# Patient Record
Sex: FEMALE | Race: WHITE | NOT HISPANIC OR LATINO | Employment: OTHER | URBAN - METROPOLITAN AREA
[De-identification: names, ages, dates, MRNs, and addresses within clinical notes are randomized per-mention and may not be internally consistent; named-entity substitution may affect disease eponyms.]

---

## 2017-03-23 ENCOUNTER — TELEPHONE (OUTPATIENT)
Dept: FAMILY MEDICINE | Facility: CLINIC | Age: 27
End: 2017-03-23

## 2017-04-10 ENCOUNTER — OFFICE VISIT (OUTPATIENT)
Dept: FAMILY MEDICINE | Facility: CLINIC | Age: 27
End: 2017-04-10
Payer: COMMERCIAL

## 2017-04-10 VITALS
BODY MASS INDEX: 22.36 KG/M2 | RESPIRATION RATE: 20 BRPM | SYSTOLIC BLOOD PRESSURE: 116 MMHG | DIASTOLIC BLOOD PRESSURE: 62 MMHG | HEIGHT: 64 IN | OXYGEN SATURATION: 99 % | TEMPERATURE: 98.4 F | WEIGHT: 131 LBS | HEART RATE: 104 BPM

## 2017-04-10 DIAGNOSIS — N94.10 DYSPAREUNIA IN FEMALE: Primary | ICD-10-CM

## 2017-04-10 DIAGNOSIS — N94.2 VAGINISMUS: ICD-10-CM

## 2017-04-10 PROCEDURE — 99213 OFFICE O/P EST LOW 20 MIN: CPT | Performed by: FAMILY MEDICINE

## 2017-04-10 ASSESSMENT — PAIN SCALES - GENERAL: PAINLEVEL: NO PAIN (0)

## 2017-04-10 NOTE — MR AVS SNAPSHOT
"              After Visit Summary   4/10/2017    Eden Yanez    MRN: [id number]           Patient Information     Date Of Birth          1990        Visit Information        Provider Department      4/10/2017 2:00 PM Miryam Perez MD [facility]        Today's Diagnoses     Dyspareunia in female    -  1    Vaginismus           Follow-ups after your visit        Who to contact     If you have questions or need follow up information about today's clinic visit or your schedule please contact [facility] directly at [phone].  Normal or non-critical lab and imaging results will be communicated to you by Wayinhart, letter or phone within 4 business days after the clinic has received the results. If you do not hear from us within 7 days, please contact the clinic through Wayinhart or phone. If you have a critical or abnormal lab result, we will notify you by phone as soon as possible.  Submit refill requests through "[employer] or call your pharmacy and they will forward the refill request to us. Please allow 3 business days for your refill to be completed.          Additional Information About Your Visit        MyChart Information     "[employer] gives you secure access to your electronic health record. If you see a primary care provider, you can also send messages to your care team and make appointments. If you have questions, please call your primary care clinic.  If you do not have a primary care provider, please call [phone] and they will assist you.        Care EveryWhere ID     This is your Care EveryWhere ID. This could be used by other organizations to access your [place] medical records  [id number]        Your Vitals Were     Pulse Temperature Respirations Height Pulse Oximetry BMI (Body Mass Index)    104 98.4  F (36.9  C) (Temporal) 20 5' 3.6\" (1.615 m) 99% 22.77 kg/m2       Blood Pressure from Last 3 Encounters:   04/10/17 116/62   09/21/16 112/60 "   07/29/16 102/60    Weight from Last 3 Encounters:   04/10/17 131 lb (59.4 kg)   09/21/16 137 lb (62.1 kg)   07/29/16 144 lb (65.3 kg)              Today, you had the following     No orders found for display       Primary Care Provider Office Phone # Fax #    Miryam Anay Perez [phone]       [facility] [address] [place] [address]        Thank you!     Thank you for choosing [facility]  for your care. Our goal is always to provide you with excellent care. Hearing back from our patients is one way we can continue to improve our services. Please take a few minutes to complete the written survey that you may receive in the mail after your visit with us. Thank you!             Your Updated Medication List - Protect others around you: Learn how to safely use, store and throw away your medicines at www.disposemymeds.org.      Notice  As of 4/10/2017 11:59 PM    You have not been prescribed any medications.

## 2017-04-10 NOTE — NURSING NOTE
"Chief Complaint   Patient presents with     Vaginal Problem     Having issues with painful intercourse       Initial /62  Pulse 104  Temp 98.4  F (36.9  C) (Temporal)  Resp 20  Ht 5' 3.6\" (1.615 m)  Wt 131 lb (59.4 kg)  SpO2 99%  BMI 22.77 kg/m2 Estimated body mass index is 22.77 kg/(m^2) as calculated from the following:    Height as of this encounter: 5' 3.6\" (1.615 m).    Weight as of this encounter: 131 lb (59.4 kg).  Medication Reconciliation: complete   Beth Allen CMA      "
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
## 2018-01-03 ENCOUNTER — ALLIED HEALTH/NURSE VISIT (OUTPATIENT)
Dept: FAMILY MEDICINE | Facility: CLINIC | Age: 28
End: 2018-01-03

## 2018-01-03 VITALS
WEIGHT: 133.25 LBS | SYSTOLIC BLOOD PRESSURE: 100 MMHG | DIASTOLIC BLOOD PRESSURE: 48 MMHG | BODY MASS INDEX: 23.16 KG/M2

## 2018-01-03 DIAGNOSIS — N91.2 ABSENCE OF MENSTRUATION: Primary | ICD-10-CM

## 2018-01-03 LAB — HCG UR QL: POSITIVE

## 2018-01-03 PROCEDURE — 99207 ZZC NO CHARGE NURSE ONLY: CPT

## 2018-01-03 PROCEDURE — 81025 URINE PREGNANCY TEST: CPT | Performed by: FAMILY MEDICINE

## 2018-01-03 RX ORDER — PRENATAL VIT/IRON FUM/FOLIC AC 27MG-0.8MG
1 TABLET ORAL DAILY
COMMUNITY
End: 2019-03-01

## 2018-01-03 NOTE — MR AVS SNAPSHOT
After Visit Summary   1/3/2018    Eden Yanez    MRN: [id number]           Patient Information     Date Of Birth          1990        Visit Information        Provider Department      1/3/2018 10:00 AM NL RN TEAM A, [facility]        Today's Diagnoses     Absence of menstruation    -  1       Follow-ups after your visit        Your next 10 appointments already scheduled     Jan 08, 2018  2:00 PM CST   New Prenatal with NL OB INTAKE   [facility]    [address] [place] [id number] [phone]              Who to contact     If you have questions or need follow up information about today's clinic visit or your schedule please contact [facility] directly at [phone].  Normal or non-critical lab and imaging results will be communicated to you by MyChart, letter or phone within 4 business days after the clinic has received the results. If you do not hear from us within 7 days, please contact the clinic through MyChart or phone. If you have a critical or abnormal lab result, we will notify you by phone as soon as possible.  Submit refill requests through R[employer] or call your pharmacy and they will forward the refill request to us. Please allow 3 business days for your refill to be completed.          Additional Information About Your Visit        MyChart Information     [employer] gives you secure access to your electronic health record. If you see a primary care provider, you can also send messages to your care team and make appointments. If you have questions, please call your primary care clinic.  If you do not have a primary care provider, please call [phone] and they will assist you.        Care EveryWhere ID     This is your Care EveryWhere ID. This could be used by other organizations to access your [place] medical records  [id number]        Your Vitals Were     Last Period  Breastfeeding? BMI (Body Mass Index)             11/18/2017 (Approximate) No 23.16 kg/m2          Blood Pressure from Last 3 Encounters:   01/03/18 100/48   04/10/17 116/62   09/21/16 112/60    Weight from Last 3 Encounters:   01/03/18 133 lb 4 oz (60.4 kg)   04/10/17 131 lb (59.4 kg)   09/21/16 137 lb (62.1 kg)              We Performed the Following     HCG qualitative urine        Primary Care Provider Office Phone # Fax #    Miryam Anay Perez [phone] [address] [place] [address]        Equal Access to Services     JESUS PATEL : Chapin Polanco, amrit moreland, dima kurtzmaester archer, jocelynn fernández. So [facility] [phone].    ATENCIÓN: Si habla español, tiene a funez disposición servicios gratuitos de asistencia lingüística. Llame al [phone].    We comply with applicable federal civil rights laws and [place] laws. We do not discriminate on the basis of race, color, national origin, age, disability, sex, sexual orientation, or gender identity.            Thank you!     Thank you for choosing [facility]  for your care. Our goal is always to provide you with excellent care. Hearing back from our patients is one way we can continue to improve our services. Please take a few minutes to complete the written survey that you may receive in the mail after your visit with us. Thank you!             Your Updated Medication List - Protect others around you: Learn how to safely use, store and throw away your medicines at www.disposemymeds.org.          This list is accurate as of: 1/3/18 10:54 AM.  Always use your most recent med list.                   Brand Name Dispense Instructions for use Diagnosis    prenatal multivitamin plus iron 27-0.8 MG Tabs per tablet      Take 1 tablet by mouth daily

## 2018-01-03 NOTE — NURSING NOTE
Eden Yanez is a 27 year old here today for a pregnancy test.  LMP: Patient's last menstrual period was 2017 (approximate).  Wt: 133 lbs 4 oz.    Symptoms include weight gain, breast tenderness, absence of menses, nausea &/or vomiting and fatigue.    Eden informed of positive pregnancy test results. NICOLE: 17    Educational advice given: nutrition, smoking and drugs & alcohol.    Current medications reviewed: Yes    Previous pregnancy history remarkable for: none.    Plan: schedule appointment with OB Educator and/or OB class, follow-up appointment with Dr. Perez for pre- care, take multivitamin or pre- vitamins and OB Education packet given.    She is to call back if she has any questions or concerns.  She is advised to notify a provider immediately if she experiences any severe cramping or abdominal pain or any vaginal bleeding.  Radah Pruitt, BSN, RN

## 2018-01-08 ENCOUNTER — PRENATAL OFFICE VISIT (OUTPATIENT)
Dept: FAMILY MEDICINE | Facility: CLINIC | Age: 28
End: 2018-01-08

## 2018-01-08 DIAGNOSIS — Z32.01 PREGNANCY, CONFIRMED, NOT FIRST: Primary | ICD-10-CM

## 2018-01-08 PROCEDURE — 99207 ZZC NO CHARGE NURSE ONLY: CPT

## 2018-01-08 NOTE — PROGRESS NOTES
1. Have you had chicken pox?   Yes    Genetic Screening    Has the patient, baby's father, or anyone in either family had:     1. Thalassemia and and MCV result less than 80?No   2.  Neural tube defect? No   3.  Congenital heart defect?No   4. Down's syndrome?No   5.  Adam-Sachs disease?No   6. Sickle Cell disease or trait?No   7. Hemophilia or other inherited problems of blood?No   8. Muscular dystropy?No   9.  Cystic fibrosis?No  10. [place]'s Chorea?No  11. Mental Retardation or autism?  No         If yes, was the person tested for Fragile X?   12. Any other inherited genetic or chromosomal disorders?No  13. Metabolic disorders such as diabetes or PKU?No  14. A child born with defects not listed above?No  15. Recurrent pregnancy loss or stillbirth?No  16.  Has the patient had any medications/street drugs/alcohol since her last menstrual period?No  18.  Does the patient or baby's father have any other genetic risks. No

## 2018-01-08 NOTE — MR AVS SNAPSHOT
After Visit Summary   1/8/2018    Eden Yanez    MRN: [id number]           Patient Information     Date Of Birth          1990        Visit Information        Provider Department      1/8/2018 2:00 PM NL OB INTAKE [facility]        Today's Diagnoses     Pregnancy, confirmed, not first    -  1       Follow-ups after your visit        Your next 10 appointments already scheduled     Jan 18, 2018  1:00 PM CST   US OB < 14 WEEKS SINGLE with PHUS1   [facility] ([facility])    [address] [place] [phone] [phone]           Please bring a list of your medicines (including vitamins, minerals and over-the-counter drugs). Also, tell your doctor about any allergies you may have. Wear comfortable clothes and leave your valuables at home.  If you re less than 20 weeks drink four 8-ounce glasses of fluid an hour before your exam. If you need to empty your bladder before your exam, try to release only a little urine. Then, drink another glass of fluid.  You may have up to two family members in the exam room. If you bring a small child, an adult must be there to care for him or her.  Please call the Imaging Department at your exam site with any questions.            Jan 18, 2018  1:45 PM CST   LAB with NL LAB PMC   [facility]    [address] [place] [phone]           Please do not eat 10-12 hours before your appointment if you are coming in fasting for labs on lipids, cholesterol, or glucose (sugar). This does not apply to pregnant women. Water, hot tea and black coffee (with nothing added) are okay. Do not drink other fluids, diet soda or chew gum.            Feb 09, 2018 11:30 AM CST   New Prenatal with [facility]am Perez MD   [facility]    [address] [place] [phone]               Future tests that were ordered for you today     Open Standing Orders        Priority Remaining Interval Expires Ordered    Anti treponema EIA Routine 1/1  3/1/2018 1/8/2018    HIV Antigen Antibody Combo Routine 1/1  3/1/2018 1/8/2018    HEPATITIS B SURFACE ANTIGEN Routine 1/1  3/1/2018 1/8/2018    CBC WITH PLATELETS Routine 1/1  3/1/2018 1/8/2018    Rubella Antibody IgG Quantitative Routine 1/1  3/1/2018 1/8/2018    ABO/Rh type and screen Routine 1/1  3/1/2018 1/8/2018    *UA reflex to Microscopic and Culture ([place]; [facility]; [facility]; [facility]; [facility]; [facility]; [place]; [place]) Routine 1/1  3/1/2018 1/8/2018          Open Future Orders        Priority Expected Expires Ordered    US OB < 14 Weeks Single Routine 1/8/2018 1/8/2019 1/8/2018            Who to contact     If you have questions or need follow up information about today's clinic visit or your schedule please contact [facility] directly at [phone].  Normal or non-critical lab and imaging results will be communicated to you by [employer], letter or phone within 4 business days after the clinic has received the results. If you do not hear from us within 7 days, please contact the clinic through [employer] System[employer] or phone. If you have a critical or abnormal lab result, we will notify you by phone as soon as possible.  Submit refill requests through [employer] or call your pharmacy and they will forward the refill request to us. Please allow 3 business days for your refill to be completed.          Additional Information About Your Visit        [employer] System[employer] Information     [employer] gives you secure access to your electronic health record. If you see a primary care provider, you can also send messages to your care team and make appointments. If you have questions, please call your primary care clinic.  If you do not have a primary care provider, please call [phone] and they will assist you.        Care  EveryWhere ID     This is your Care EveryWhere ID. This could be used by other organizations to access your [place] medical records  [id number]        Your Vitals Were     Last Period                   11/18/2017 (Approximate)            Blood Pressure from Last 3 Encounters:   01/03/18 100/48   04/10/17 116/62   09/21/16 112/60    Weight from Last 3 Encounters:   01/03/18 133 lb 4 oz (60.4 kg)   04/10/17 131 lb (59.4 kg)   09/21/16 137 lb (62.1 kg)               Primary Care Provider Office Phone # Fax #    Miryam Anay Perez [phone] [address] [place] [address]        Equal Access to Services     YADI PATEL : Hadii michael monsalve hadasho Soomaali, waaxda luqadaha, qaybta kaalmada adeegyada, waxsteph fernández. So [facility] [phone].    ATENCIÓN: Si habla español, tiene a funez disposición servicios gratuitos de asistencia lingüística. [facility] [phone].    We comply with applicable federal civil rights laws and [place] laws. We do not discriminate on the basis of race, color, national origin, age, disability, sex, sexual orientation, or gender identity.            Thank you!     Thank you for choosing [facility]  for your care. Our goal is always to provide you with excellent care. Hearing back from our patients is one way we can continue to improve our services. Please take a few minutes to complete the written survey that you may receive in the mail after your visit with us. Thank you!             Your Updated Medication List - Protect others around you: Learn how to safely use, store and throw away your medicines at www.disposemymeds.org.          This list is accurate as of: 1/8/18  2:25 PM.  Always use your most recent med list.                   Brand Name Dispense Instructions for use Diagnosis    prenatal multivitamin plus iron 27-0.8 MG Tabs per tablet      Take 1 tablet by mouth daily

## 2018-01-18 ENCOUNTER — HOSPITAL ENCOUNTER (OUTPATIENT)
Dept: ULTRASOUND IMAGING | Facility: CLINIC | Age: 28
Discharge: HOME | End: 2018-01-18
Attending: FAMILY MEDICINE | Admitting: FAMILY MEDICINE

## 2018-01-18 DIAGNOSIS — Z32.01 PREGNANCY, CONFIRMED, NOT FIRST: ICD-10-CM

## 2018-01-18 LAB
ABO + RH BLD: NORMAL
ABO + RH BLD: NORMAL
ALBUMIN UR-MCNC: NEGATIVE MG/DL
APPEARANCE UR: CLEAR
BILIRUB UR QL STRIP: NEGATIVE
BLD GP AB SCN SERPL QL: NORMAL
BLOOD BANK CMNT PATIENT-IMP: NORMAL
COLOR UR AUTO: YELLOW
ERYTHROCYTE [DISTWIDTH] IN BLOOD BY AUTOMATED COUNT: 12.3 % (ref 10–15)
GLUCOSE UR STRIP-MCNC: NEGATIVE MG/DL
HCT VFR BLD AUTO: 37.9 % (ref 35–47)
HGB BLD-MCNC: 12.8 G/DL (ref 11.7–15.7)
HGB UR QL STRIP: NEGATIVE
KETONES UR STRIP-MCNC: NEGATIVE MG/DL
LEUKOCYTE ESTERASE UR QL STRIP: NEGATIVE
MCH RBC QN AUTO: 30.6 PG (ref 26.5–33)
MCHC RBC AUTO-ENTMCNC: 33.8 G/DL (ref 31.5–36.5)
MCV RBC AUTO: 91 FL (ref 78–100)
NITRATE UR QL: NEGATIVE
PH UR STRIP: 6 PH (ref 5–7)
PLATELET # BLD AUTO: 166 10E9/L (ref 150–450)
RBC # BLD AUTO: 4.18 10E12/L (ref 3.8–5.2)
SOURCE: NORMAL
SP GR UR STRIP: 1.01 (ref 1–1.03)
SPECIMEN EXP DATE BLD: NORMAL
UROBILINOGEN UR STRIP-MCNC: 0 MG/DL (ref 0–2)
WBC # BLD AUTO: 7.7 10E9/L (ref 4–11)

## 2018-01-18 PROCEDURE — 81003 URINALYSIS AUTO W/O SCOPE: CPT | Performed by: FAMILY MEDICINE

## 2018-01-18 PROCEDURE — 86780 TREPONEMA PALLIDUM: CPT | Performed by: FAMILY MEDICINE

## 2018-01-18 PROCEDURE — 86900 BLOOD TYPING SEROLOGIC ABO: CPT | Performed by: FAMILY MEDICINE

## 2018-01-18 PROCEDURE — 87389 HIV-1 AG W/HIV-1&-2 AB AG IA: CPT | Performed by: FAMILY MEDICINE

## 2018-01-18 PROCEDURE — 85027 COMPLETE CBC AUTOMATED: CPT | Performed by: FAMILY MEDICINE

## 2018-01-18 PROCEDURE — 86762 RUBELLA ANTIBODY: CPT | Performed by: FAMILY MEDICINE

## 2018-01-18 PROCEDURE — 36415 COLL VENOUS BLD VENIPUNCTURE: CPT | Performed by: FAMILY MEDICINE

## 2018-01-18 PROCEDURE — 76801 OB US < 14 WKS SINGLE FETUS: CPT

## 2018-01-18 PROCEDURE — 86901 BLOOD TYPING SEROLOGIC RH(D): CPT | Performed by: FAMILY MEDICINE

## 2018-01-18 PROCEDURE — 86850 RBC ANTIBODY SCREEN: CPT | Performed by: FAMILY MEDICINE

## 2018-01-18 PROCEDURE — 87340 HEPATITIS B SURFACE AG IA: CPT | Performed by: FAMILY MEDICINE

## 2018-01-19 LAB
HBV SURFACE AG SERPL QL IA: NONREACTIVE
HIV 1+2 AB+HIV1 P24 AG SERPL QL IA: NONREACTIVE
RUBV IGG SERPL IA-ACNC: 6 IU/ML
T PALLIDUM IGG+IGM SER QL: NEGATIVE

## 2018-01-24 NOTE — PROGRESS NOTES
Lorenzo Harmon, Your labs are all looking good, except you do NOT have immunity to Rubella, and your blood type is A negative (which I believe you already knew).  We can review these in detail at your next visit.   Miryam Perez MD

## 2018-01-24 NOTE — PROGRESS NOTES
Eden, congratulations. Your ultrasound looks good. Your due date by the ultrasound is 8/25/18, and we'll discuss in more detail at your next visit.  Miryam Perez MD

## 2018-02-02 ENCOUNTER — TELEPHONE (OUTPATIENT)
Dept: FAMILY MEDICINE | Facility: CLINIC | Age: 28
End: 2018-02-02

## 2018-02-02 NOTE — TELEPHONE ENCOUNTER
Routed to provider to advise if and when pt can be worked in, no open availability for next week. Pt is currently 10 wks 6 days. Corie Mike CMA

## 2018-02-02 NOTE — TELEPHONE ENCOUNTER
Reason for Call:  Same Day Appointment, Requested Provider:  Miryam Perez M.D.    PCP: Miryam Perez    Reason for visit: Patient was scheduled for next Fri 2.9 for 1st OB appt, that appt was accidentally canceled, any chance she could still be seen or any day next week, please advise next week when back in clinic    Duration of symptoms:     Have you been treated for this in the past? Yes    Additional comments:     Can we leave a detailed message on this number? YES    Phone number patient can be reached at: Cell number on file:    Telephone Information:   Mobile [phone]       Best Time:     Call taken on 2/2/2018 at 12:51 PM by Lina Valencia

## 2018-02-06 NOTE — TELEPHONE ENCOUNTER
Patient can be worked in for 1st OB visit on 2/12 at 11:15am. Please contact patient to advise and confirm. Appointment made to hold time.  Marisol Zhong CMA

## 2018-02-12 ENCOUNTER — PRENATAL OFFICE VISIT (OUTPATIENT)
Dept: FAMILY MEDICINE | Facility: CLINIC | Age: 28
End: 2018-02-12

## 2018-02-12 VITALS
OXYGEN SATURATION: 100 % | HEART RATE: 114 BPM | TEMPERATURE: 97.1 F | HEIGHT: 64 IN | WEIGHT: 138 LBS | DIASTOLIC BLOOD PRESSURE: 54 MMHG | BODY MASS INDEX: 23.56 KG/M2 | SYSTOLIC BLOOD PRESSURE: 116 MMHG

## 2018-02-12 DIAGNOSIS — Z98.891 PREVIOUS CESAREAN SECTION: ICD-10-CM

## 2018-02-12 DIAGNOSIS — Z34.81 ENCOUNTER FOR SUPERVISION OF OTHER NORMAL PREGNANCY IN FIRST TRIMESTER: Primary | ICD-10-CM

## 2018-02-12 LAB
SPECIMEN SOURCE: NORMAL
WET PREP SPEC: NORMAL

## 2018-02-12 PROCEDURE — 87491 CHLMYD TRACH DNA AMP PROBE: CPT | Performed by: FAMILY MEDICINE

## 2018-02-12 PROCEDURE — 99207 ZZC FIRST OB VISIT: CPT | Performed by: FAMILY MEDICINE

## 2018-02-12 PROCEDURE — 87591 N.GONORRHOEAE DNA AMP PROB: CPT | Performed by: FAMILY MEDICINE

## 2018-02-12 PROCEDURE — 87210 SMEAR WET MOUNT SALINE/INK: CPT | Performed by: FAMILY MEDICINE

## 2018-02-12 ASSESSMENT — PAIN SCALES - GENERAL: PAINLEVEL: NO PAIN (0)

## 2018-02-12 NOTE — MR AVS SNAPSHOT
After Visit Summary   2/12/2018    Eden Yanez    MRN: [id number]           Patient Information     Date Of Birth          1990        Visit Information        Provider Department      2/12/2018 11:15 AM Miryam Perez MD [facility]        Today's Diagnoses     Encounter for supervision of other normal pregnancy in first trimester    -  1       Follow-ups after your visit        Your next 10 appointments already scheduled     Mar 07, 2018 11:00 AM CST   ESTABLISHED PRENATAL with Miryam Perez MD   [facility] ([address] [address]   [phone]            Apr 13, 2018  9:45 AM CDT   ESTABLISHED PRENATAL with Miryam Perez MD   [facility] ([address] [address]   [phone]            May 11, 2018 11:00 AM CDT   ESTABLISHED PRENATAL with Miryam Perez MD   [facility] ([address] [address]   [phone]              Who to contact     If you have questions or need follow up information about today's clinic visit or your schedule please contact [facility] directly at [phone].  Normal or non-critical lab and imaging results will be communicated to you by MyChart, letter or phone within 4 business days after the clinic has received the results. If you do not hear from us within 7 days, please contact the clinic through MyChart or phone. If you have a critical or abnormal lab result, we will notify you by phone as soon as possible.  Submit refill requests through Newsummitbio or call your pharmacy and they will forward the refill request to us. Please allow 3 business days for your refill to be completed.          Additional Information About Your Visit        MyChart Information     [facility]  "gives you secure access to your electronic health record. If you see a primary care provider, you can also send messages to your care team and make appointments. If you have questions, please call your primary care clinic.  If you do not have a primary care provider, please call [phone] and they will assist you.        Care EveryWhere ID     This is your Care EveryWhere ID. This could be used by other organizations to access your [facility] medical records  [id number]        Your Vitals Were     Pulse Temperature Height Last Period Pulse Oximetry BMI (Body Mass Index)    114 97.1  F (36.2  C) (Temporal) 5' 4\" (1.626 m) 11/18/2017 (Approximate) 100% 23.69 kg/m2       Blood Pressure from Last 3 Encounters:   02/12/18 116/54   01/03/18 100/48   04/10/17 116/62    Weight from Last 3 Encounters:   02/12/18 138 lb (62.6 kg)   01/03/18 133 lb 4 oz (60.4 kg)   04/10/17 131 lb (59.4 kg)              We Performed the Following     Chlamydia trachomatis PCR     Neisseria gonorrhoeae PCR     Wet prep        Primary Care Provider Office Phone # Fax #    Miryam Anay Perez [phone]        [facility] [address] [place] [address]        Equal Access to Services     YADI PATEL : Hadii aad ku hadasho Soomaali, waaxda luqadaha, qaybta kaalmada adeegyada, waxay idiin hayaan lorraine massey . So [facility] [phone].    ATENCIÓN: Si habla español, tiene a funez disposición servicios gratuitos de asistencia lingüística. Llame al [phone].    We comply with applicable federal civil rights laws and [place] laws. We do not discriminate on the basis of race, color, national origin, age, disability, sex, sexual orientation, or gender identity.            Thank you!     Thank you for choosing [facility]  for your care. Our goal is always to provide you with excellent care. Hearing back from our patients is one way we can continue to improve our services. Please take a few minutes to " complete the written survey that you may receive in the mail after your visit with us. Thank you!             Your Updated Medication List - Protect others around you: Learn how to safely use, store and throw away your medicines at www.disposemymeds.org.          This list is accurate as of 2/12/18  1:10 PM.  Always use your most recent med list.                   Brand Name Dispense Instructions for use Diagnosis    prenatal multivitamin plus iron 27-0.8 MG Tabs per tablet      Take 1 tablet by mouth daily

## 2018-02-12 NOTE — NURSING NOTE
"Chief Complaint   Patient presents with     Prenatal Care       Initial /54  Pulse 114  Temp 97.1  F (36.2  C) (Temporal)  Ht 5' 4\" (1.626 m)  Wt 138 lb (62.6 kg)  LMP 11/18/2017 (Approximate)  SpO2 100%  BMI 23.69 kg/m2 Estimated body mass index is 23.69 kg/(m^2) as calculated from the following:    Height as of this encounter: 5' 4\" (1.626 m).    Weight as of this encounter: 138 lb (62.6 kg).  Medication Reconciliation: complete   Marisol Zhong, MINOO    "

## 2018-02-12 NOTE — PROGRESS NOTES
Eden Yanez is a 27 year old,  female who presents alone for 1st OB visit. Her  Nate could not be here today. She has talked with OB Ed prior to today's visit.  She is 12w2d by approximate LMP and early U/S.     - Patient had Migraines for about a week around her 9-10th week that have since resolved .  - She was nauseated at the beginning of her pregnancy but this has improved as well.   - She has been fatigued throughout her pregnancy.      Past Medical History:   Diagnosis Date     NO ACTIVE PROBLEMS      I reviewed her previous OB history:  Obstetric History       T2      L2     SAB0   TAB1   Ectopic0   Multiple0   Live Births2       # Outcome Date GA Lbr Michelet/2nd Weight Sex Delivery Anes PTL Lv   4 Current            3 Term 16 38w5d  7 lb 4 oz (3.289 kg) M CS-LTranv   RICK      Name: Keith      Apgar1:  9                Apgar5: 9   2 Term 14 42w0d / 06:19 8 lb 14.3 oz (4.035 kg) F CS-LTranv EPI,Spinal,IV [facility]IONAL Y RICK      Name: Francia      Complications: Fetal heart rate decelerations affecting management of mother      Apgar1:  9                Apgar5: 9   1 TAB 2009     TAB         Obstetric Comments   EDC 2018 based on LMP.     .    Past Medical History:   Diagnosis Date     NO ACTIVE PROBLEMS       Patient Active Problem List    Diagnosis Date Noted     Vomiting 2016     Priority: Medium      O:  Vitals noted.  Patient alert, oriented, and in no acute distress.   Eyes: PERRLA, EOMI.  Ears:  Canals clear, TM's nl bilaterally.  No erythema or fluid.   Nares patent without inflammation or drainage.   Oral:  Oropharynx nl without erythema, exudate, mass or other lesions.   Neck:  Supple without lymphadenopathy, JVD or masses.  CV:  RRR without murmur.  Respiratory:  Lungs clear to auscultation bilaterally.  Breasts:  not examined today.   Abdomen:  Soft, nontender, nondistended with good bowel sounds and no masses or hepatosplenomegaly.  Uterus is  palpable is the abdomen.   Fetal heart tones were heard as noted in OB flowsheet.    Pelvic: Vaginal exam reveals normal external and internal genitalia.  Cervix is closed, long and thick.  No lesions or abnormalities seen.  Bimanual exam reveals a nontender, gravid uterus consistant with 10-12 weeks with no CMT.  No adnexal masses or tenderness.     Extremities: normal without edema or lesions.      Diagnostics:  No orders of the defined types were placed in this encounter.    Patient's PAP was NIL in 3/2016.     Routine labs were sent, including wet prep and gen probe.    A:      ICD-10-CM    1. Encounter for supervision of other normal pregnancy in first trimester Z34.81 Wet prep     Neisseria gonorrhoeae PCR     Chlamydia trachomatis PCR   2. Previous  section Z98.891      P:  Labs collected as above, will notify with results and discuss further evaluation/ treatment if needed at that time.   Discussed AFP, patient will discuss with her  and notify me at her next visit.   Her blood pressure is within normal limits. Encouraged her to work on staying hydrated, try a small amount of caffeine, and try Tylenol sparingly for her migraines.   Discussed her weight gain. Encouraged her to focus on mostly veggies and fruits, lean proteins, and adequate calories without excess junk food.   Discussed routine pregnancy care, schedule of visits, nutrition, exercise, travel, answered questions.  Will follow up in 4 weeks or sooner with any concerns.  Will call with lab results.    This document serves as a record of services personally performed by Miryam Perez MD. It was created on their behalf by Roberta Padron, a trained medical scribe. The creation of this record is based on the provider's personal observations and the statements of the patient. This document has been checked and approved by the attending provider.     Miryam Perez MD

## 2018-02-13 LAB
C TRACH DNA SPEC QL NAA+PROBE: NEGATIVE
N GONORRHOEA DNA SPEC QL NAA+PROBE: NEGATIVE
SPECIMEN SOURCE: NORMAL
SPECIMEN SOURCE: NORMAL

## 2018-02-17 ENCOUNTER — NURSE TRIAGE (OUTPATIENT)
Dept: NURSING | Facility: CLINIC | Age: 28
End: 2018-02-17

## 2018-02-17 NOTE — TELEPHONE ENCOUNTER
She is 13 weeks pregnant and thinks she has a UTI. Denies fever. Frequency, discomfort with urination. Cloudy urine. Gave information to [email or web address] and .    Noa Yousif RN, [facility]    Reason for Disposition    [1] Painful urination in pregnancy AND [2] no current antibiotic treatment    Additional Information    Negative: Shock suspected (e.g., cold/pale/clammy skin, too weak to stand, low BP, rapid pulse)    Negative: Sounds like a life-threatening emergency to the triager    Negative: Followed a genital area injury    Negative: [1] Unable to urinate (or only a few drops) > 4 hours AND     [2] bladder feels very full (e.g., palpable bladder or strong urge to urinate)    Negative: [1] Pregnant 23 or more weeks AND [2] baby is moving less today (e.g., kick count < 5 in 1 hour or < 10 in 2 hours)    Negative: SEVERE pain with urination    Negative: Side (flank) or lower back pain present    Negative: Fever > 100.4 F (38.0 C)    Negative: Patient sounds very sick or weak to the triager    Negative: Blood in urine (red, pink, or tea-colored)    Negative: Diabetes mellitus or weak immune system (e.g., HIV positive, cancer chemotherapy, transplant patient)    Negative: Bedridden (e.g., chronic illness, recovering from surgery)    Negative: Unusual vaginal discharge (e.g., bad smelling, yellow, green, or foamy-white)    Negative: [1] Taking antibiotic > 72 hours (3 days) for UTI AND [2] painful urination not improved    Negative: [1] Taking antibiotic > 24 hours for UTI AND [2] fever persists    Protocols used: PREGNANCY - URINATION PAIN-ADULT-

## 2018-02-19 PROBLEM — Z98.891 PREVIOUS CESAREAN SECTION: Status: ACTIVE | Noted: 2018-02-19

## 2018-02-19 PROBLEM — Z34.81 ENCOUNTER FOR SUPERVISION OF OTHER NORMAL PREGNANCY IN FIRST TRIMESTER: Status: ACTIVE | Noted: 2018-02-19

## 2018-03-07 ENCOUNTER — PRENATAL OFFICE VISIT (OUTPATIENT)
Dept: FAMILY MEDICINE | Facility: CLINIC | Age: 28
End: 2018-03-07

## 2018-03-07 VITALS
BODY MASS INDEX: 23.48 KG/M2 | HEART RATE: 90 BPM | TEMPERATURE: 97.9 F | DIASTOLIC BLOOD PRESSURE: 46 MMHG | SYSTOLIC BLOOD PRESSURE: 92 MMHG | WEIGHT: 136.8 LBS | OXYGEN SATURATION: 98 %

## 2018-03-07 DIAGNOSIS — Z34.82 ENCOUNTER FOR SUPERVISION OF OTHER NORMAL PREGNANCY IN SECOND TRIMESTER: Primary | ICD-10-CM

## 2018-03-07 PROCEDURE — 99207 ZZC PRENATAL VISIT: CPT | Performed by: FAMILY MEDICINE

## 2018-03-07 ASSESSMENT — PAIN SCALES - GENERAL: PAINLEVEL: NO PAIN (0)

## 2018-03-07 NOTE — NURSING NOTE
"Chief Complaint   Patient presents with     Prenatal Care       Initial BP 92/46  Pulse 90  Temp 97.9  F (36.6  C) (Temporal)  Wt 136 lb 12.8 oz (62.1 kg)  LMP 11/18/2017 (Approximate)  SpO2 98%  BMI 23.48 kg/m2 Estimated body mass index is 23.48 kg/(m^2) as calculated from the following:    Height as of 2/12/18: 5' 4\" (1.626 m).    Weight as of this encounter: 136 lb 12.8 oz (62.1 kg).  Medication Reconciliation: complete   Marisol Zhong CMA    "

## 2018-03-07 NOTE — MR AVS SNAPSHOT
After Visit Summary   3/7/2018    Eden Yanez    MRN: [id number]           Patient Information     Date Of Birth          1990        Visit Information        Provider Department      3/7/2018 11:00 AM Miryam Perez MD [facility]        Today's Diagnoses     Encounter for supervision of other normal pregnancy in second trimester    -  1       Follow-ups after your visit        Your next 10 appointments already scheduled     Apr 13, 2018  9:45 AM CDT   ESTABLISHED PRENATAL with Miryam Perez MD   [facility] ([facility])    [address] [place] [address]   [phone]            Apr 13, 2018 11:00 AM CDT   (Arrive by 10:45 AM)   US OB > 14 WEEKS COMPLETE SINGLE with PHUS1   [facility] ([facility])    [address] [place] [address]   [phone]           Please bring a list of your medicines (including vitamins, minerals and over-the-counter drugs). Also, tell your doctor about any allergies you may have. Wear comfortable clothes and leave your valuables at home.  If you re less than 20 weeks drink four 8-ounce glasses of fluid an hour before your exam. If you need to empty your bladder before your exam, try to release only a little urine. Then, drink another glass of fluid.  You may have up to two family members in the exam room. If you bring a small child, an adult must be there to care for him or her.  Please call the Imaging Department at your exam site with any questions.            May 11, 2018 11:00 AM CDT   ESTABLISHED PRENATAL with Miryam Perez MD   [facility] ([facility])    [address] [place] [address]   [phone]              Future tests that were ordered for you today     Open Future Orders        Priority Expected Expires Ordered    US OB > 14 Weeks Complete Single  Routine 4/13/2018 3/7/2019 3/7/2018            Who to contact     If you have questions or need follow up information about today's clinic visit or your schedule please contact [facility] directly at [phone].  Normal or non-critical lab and imaging results will be communicated to you by MyChart, letter or phone within 4 business days after the clinic has received the results. If you do not hear from us within 7 days, please contact the clinic through Peer39hart or phone. If you have a critical or abnormal lab result, we will notify you by phone as soon as possible.  Submit refill requests through KidStart or call your pharmacy and they will forward the refill request to us. Please allow 3 business days for your refill to be completed.          Additional Information About Your Visit        Peer39harThoughtful Movers Information     [employer] gives you secure access to your electronic health record. If you see a primary care provider, you can also send messages to your care team and make appointments. If you have questions, please call your primary care clinic.  If you do not have a primary care provider, please call [phone] and they will assist you.        Care EveryWhere ID     This is your Care EveryWhere ID. This could be used by other organizations to access your [facility] medical records  [id number]        Your Vitals Were     Pulse Temperature Last Period Pulse Oximetry BMI (Body Mass Index)       90 97.9  F (36.6  C) (Temporal) 11/18/2017 (Approximate) 98% 23.48 kg/m2        Blood Pressure from Last 3 Encounters:   03/07/18 92/46   02/12/18 116/54   01/03/18 100/48    Weight from Last 3 Encounters:   03/07/18 136 lb 12.8 oz (62.1 kg)   02/12/18 138 lb (62.6 kg)   01/03/18 133 lb 4 oz (60.4 kg)               Primary Care Provider Office Phone # Fax #    Miryam Perez [phone]       [address] [place] [address]        Equal Access to Services     YADI SHERMAN: Chapin  michael Polanco, amrit moreland, qakamranta kamirta dennysrene, waxsteph jeanna lemonsreginoshekhar massey pradeep. So [facility] [phone].    ATENCIÓN: Si habla español, tiene a funez disposición servicios gratuitos de asistencia lingüística. Llame al [phone].    We comply with applicable federal civil rights laws and [place] laws. We do not discriminate on the basis of race, color, national origin, age, disability, sex, sexual orientation, or gender identity.            Thank you!     Thank you for choosing [facility]  for your care. Our goal is always to provide you with excellent care. Hearing back from our patients is one way we can continue to improve our services. Please take a few minutes to complete the written survey that you may receive in the mail after your visit with us. Thank you!             Your Updated Medication List - Protect others around you: Learn how to safely use, store and throw away your medicines at www.disposemymeds.org.          This list is accurate as of 3/7/18 11:39 AM.  Always use your most recent med list.                   Brand Name Dispense Instructions for use Diagnosis    prenatal multivitamin plus iron 27-0.8 MG Tabs per tablet      Take 1 tablet by mouth daily

## 2018-03-07 NOTE — PROGRESS NOTES
Doing well overall.  No bleeding, no regular ctx. Patient says she may be experiencing occasional fetal movement, but is not certain. She morning sickness has improved and her energy has increased.   The patient had diarrhea for 2 days last week which she thinks was related to a virus. Her children experienced similar symptoms. This resolved completely. No headaches, dizziness, visual blurriness, nausea, emesis, or other associated symptoms.   Patient contacted the clinic on 2/17 complaining of urinary frequency. Her symptoms have resolved.   Discussed AFP - Patient declines.   20 Week U/S scheduled, will notify with results.   Discussed hypotension during the second trimester - Discussed symptoms of lightheadedness/ dizziness. She should work on staying hydrated. She can try caffeine/ salt prn.   Encouraged her to make sure that she gets up and moves at least once an hour during prolonged sitting.    Reportable signs and symptoms discussed.  Follow up in 4 weeks or sooner if needed.   Miryam Perez MD

## 2018-04-13 ENCOUNTER — HOSPITAL ENCOUNTER (OUTPATIENT)
Dept: ULTRASOUND IMAGING | Facility: CLINIC | Age: 28
Discharge: HOME | End: 2018-04-13
Attending: FAMILY MEDICINE | Admitting: FAMILY MEDICINE

## 2018-04-13 ENCOUNTER — PRENATAL OFFICE VISIT (OUTPATIENT)
Dept: FAMILY MEDICINE | Facility: CLINIC | Age: 28
End: 2018-04-13

## 2018-04-13 VITALS
SYSTOLIC BLOOD PRESSURE: 118 MMHG | OXYGEN SATURATION: 99 % | WEIGHT: 145.2 LBS | TEMPERATURE: 97.6 F | BODY MASS INDEX: 24.92 KG/M2 | DIASTOLIC BLOOD PRESSURE: 48 MMHG | HEART RATE: 89 BPM

## 2018-04-13 DIAGNOSIS — Z34.81 ENCOUNTER FOR SUPERVISION OF OTHER NORMAL PREGNANCY IN FIRST TRIMESTER: Primary | ICD-10-CM

## 2018-04-13 DIAGNOSIS — Z98.891 PREVIOUS CESAREAN SECTION: ICD-10-CM

## 2018-04-13 DIAGNOSIS — Z34.82 ENCOUNTER FOR SUPERVISION OF OTHER NORMAL PREGNANCY IN SECOND TRIMESTER: ICD-10-CM

## 2018-04-13 DIAGNOSIS — R82.90 ABNORMAL URINE ODOR: ICD-10-CM

## 2018-04-13 PROCEDURE — 99207 ZZC PRENATAL VISIT: CPT | Performed by: FAMILY MEDICINE

## 2018-04-13 PROCEDURE — 76805 OB US >/= 14 WKS SNGL FETUS: CPT

## 2018-04-13 ASSESSMENT — PAIN SCALES - GENERAL: PAINLEVEL: NO PAIN (0)

## 2018-04-13 NOTE — MR AVS SNAPSHOT
After Visit Summary   4/13/2018    Eden Yanez    MRN: [id number]           Patient Information     Date Of Birth          1990        Visit Information        Provider Department      4/13/2018 9:45 AM Miryam Perez MD [facility]        Today's Diagnoses     Encounter for supervision of other normal pregnancy in first trimester    -  1    Abnormal urine odor           Follow-ups after your visit        Your next 10 appointments already scheduled     Apr 13, 2018 11:00 AM CDT   (Arrive by 10:45 AM)   US OB > 14 WEEKS COMPLETE SINGLE with PHUS1   [facility] ([facility])    [address] [place] [address]   [phone]           Please bring a list of your medicines (including vitamins, minerals and over-the-counter drugs). Also, tell your doctor about any allergies you may have. Wear comfortable clothes and leave your valuables at home.  If you re less than 20 weeks drink four 8-ounce glasses of fluid an hour before your exam. If you need to empty your bladder before your exam, try to release only a little urine. Then, drink another glass of fluid.  You may have up to two family members in the exam room. If you bring a small child, an adult must be there to care for him or her.  Please call the Imaging Department at your exam site with any questions.            May 11, 2018 11:00 AM CDT   ESTABLISHED PRENATAL with Miryam Perez MD   [facility] ([facility])    [address] [place] [address]   [phone]            Jun 08, 2018  9:45 AM CDT   ESTABLISHED PRENATAL with Miryam Perez MD   [facility] ([facility])    [address] [place] [address]   [phone]            Jun 22, 2018 10:00 AM CDT   ESTABLISHED PRENATAL with aRheem Batista MD   [facility] ([place]  [address] [address]   [phone]            Jun 22, 2018 11:00 AM CDT   ESTABLISHED PRENATAL with Miryam Perez MD   [facility] ([facility])    [address] [place] [address]   [phone]            Jul 06, 2018 11:00 AM CDT   ESTABLISHED PRENATAL with Miryam Perez MD   [facility] ([address] [address]   [phone]            Jul 18, 2018 11:00 AM CDT   ESTABLISHED PRENATAL with Miryam Perez MD   [facility] ([address] [address]   [phone]              Future tests that were ordered for you today     Open Standing Orders        Priority Remaining Interval Expires Ordered    *UA reflex to Microscopic and Culture ([place]; [facility]; [facility]; [facility]; [facility]; [facility]; [place]; [place]) Routine 5/5 prn 4/13/2019 4/13/2018            Who to contact     If you have questions or need follow up information about today's clinic visit or your schedule please contact [facility] directly at [phone].  Normal or non-critical lab and imaging results will be communicated to you by MyChart, letter or phone within 4 business days after the clinic has received the results. If you do not hear from us within 7 days, please contact the clinic through Alkymoshart or phone. If you have a critical or abnormal lab result, we will notify you by phone as soon as possible.  Submit refill requests through eDreams Ed[employer] or call your pharmacy and they will forward the refill request to us. Please allow 3 business days for your refill to be completed.          Additional Information About Your Visit        eDreams [employer] Information     [employer] gives you secure access to your electronic health record. If you see a primary  care provider, you can also send messages to your care team and make appointments. If you have questions, please call your primary care clinic.  If you do not have a primary care provider, please call [phone] and they will assist you.        Care EveryWhere ID     This is your Care EveryWhere ID. This could be used by other organizations to access your [place] medical records  [id number]        Your Vitals Were     Pulse Temperature Last Period Pulse Oximetry BMI (Body Mass Index)       89 97.6  F (36.4  C) (Temporal) 11/18/2017 (Approximate) 99% 24.92 kg/m2        Blood Pressure from Last 3 Encounters:   04/13/18 118/48   03/07/18 92/46   02/12/18 116/54    Weight from Last 3 Encounters:   04/13/18 145 lb 3.2 oz (65.9 kg)   03/07/18 136 lb 12.8 oz (62.1 kg)   02/12/18 138 lb (62.6 kg)               Primary Care Provider Office Phone # Fax #    Miryam Anay Perez [phone] [address] [place] [address]        Equal Access to Services     [facility] Hospital[facility] AH: Hadii aad ku hadasho Soisauraali, waaxda luqadaha, qaybta kaalmada adeegyada, jocelynn yepezn lorraine massey . So [facility] [phone].    ATENCIÓN: Si habla español, tiene a funez disposición servicios gratuitos de asistencia lingüística. Llame al [phone].    We comply with applicable federal civil rights laws and [place] laws. We do not discriminate on the basis of race, color, national origin, age, disability, sex, sexual orientation, or gender identity.            Thank you!     Thank you for choosing [facility]  for your care. Our goal is always to provide you with excellent care. Hearing back from our patients is one way we can continue to improve our services. Please take a few minutes to complete the written survey that you may receive in the mail after your visit with us. Thank you!             Your Updated Medication List - Protect others around you: Learn how to safely use, store and  throw away your medicines at www.disposemymeds.org.          This list is accurate as of 4/13/18 10:28 AM.  Always use your most recent med list.                   Brand Name Dispense Instructions for use Diagnosis    prenatal multivitamin plus iron 27-0.8 MG Tabs per tablet      Take 1 tablet by mouth daily

## 2018-04-13 NOTE — PROGRESS NOTES
Doing well overall.  No bleeding, no regular ctx  No headaches, dizziness, visual blurriness, nausea, emesis, or other associated symptoms.   she had concerns about her urine appearing cloudy a few days ago, but it has since cleared up. No pain or bleeding. We discussed that if it recurs, return to lab for UA, standing orders placed.    She has routine u/s today.     Discussed common 2nd trimester symptoms, especially hypotension.   Discussed nutrition and weight gain, encouraged her to monitor her intake carefully as she is already at 20 lbs.   Will do 1 hour GTT, RPR, HGB at 28 weeks    Discussed fetal kick counts after 22 weeks.   C section consult scheduled with Dr. Batista around 30 weeks.   Follow up in 4 weeks or sooner if needed.    Miryam Perez MD

## 2018-04-13 NOTE — NURSING NOTE
"Chief Complaint   Patient presents with     Prenatal Care     urine concerns       Initial /48  Pulse 89  Temp 97.6  F (36.4  C) (Temporal)  Wt 145 lb 3.2 oz (65.9 kg)  LMP 11/18/2017 (Approximate)  SpO2 99%  BMI 24.92 kg/m2 Estimated body mass index is 24.92 kg/(m^2) as calculated from the following:    Height as of 2/12/18: 5' 4\" (1.626 m).    Weight as of this encounter: 145 lb 3.2 oz (65.9 kg).  Medication Reconciliation: complete   Marisol Zhong, MINOO    "

## 2018-04-16 NOTE — PROGRESS NOTES
Eden,  Your ultrasound looks completely normal.  No concerns about anything with the baby at this point.   Miryam Perez MD

## 2018-04-30 DIAGNOSIS — R82.90 ABNORMAL URINE ODOR: ICD-10-CM

## 2018-04-30 DIAGNOSIS — N30.00 ACUTE CYSTITIS WITHOUT HEMATURIA: Primary | ICD-10-CM

## 2018-04-30 LAB
ALBUMIN UR-MCNC: NEGATIVE MG/DL
APPEARANCE UR: ABNORMAL
BACTERIA #/AREA URNS HPF: ABNORMAL /HPF
BILIRUB UR QL STRIP: NEGATIVE
COLOR UR AUTO: YELLOW
GLUCOSE UR STRIP-MCNC: NEGATIVE MG/DL
HGB UR QL STRIP: NEGATIVE
KETONES UR STRIP-MCNC: NEGATIVE MG/DL
LEUKOCYTE ESTERASE UR QL STRIP: NEGATIVE
MUCOUS THREADS #/AREA URNS LPF: PRESENT /LPF
NITRATE UR QL: POSITIVE
PH UR STRIP: 5 PH (ref 5–7)
RBC #/AREA URNS AUTO: 0 /HPF (ref 0–2)
SOURCE: ABNORMAL
SP GR UR STRIP: 1.01 (ref 1–1.03)
SQUAMOUS #/AREA URNS AUTO: 3 /HPF (ref 0–1)
UROBILINOGEN UR STRIP-MCNC: 0 MG/DL (ref 0–2)
WBC #/AREA URNS AUTO: 2 /HPF (ref 0–5)

## 2018-04-30 PROCEDURE — 87186 SC STD MICRODIL/AGAR DIL: CPT | Performed by: FAMILY MEDICINE

## 2018-04-30 PROCEDURE — 87086 URINE CULTURE/COLONY COUNT: CPT | Performed by: FAMILY MEDICINE

## 2018-04-30 PROCEDURE — 87088 URINE BACTERIA CULTURE: CPT | Performed by: FAMILY MEDICINE

## 2018-04-30 PROCEDURE — 81001 URINALYSIS AUTO W/SCOPE: CPT | Performed by: FAMILY MEDICINE

## 2018-05-01 ENCOUNTER — MYC MEDICAL ADVICE (OUTPATIENT)
Dept: FAMILY MEDICINE | Facility: CLINIC | Age: 28
End: 2018-05-01

## 2018-05-01 DIAGNOSIS — N30.00 ACUTE CYSTITIS WITHOUT HEMATURIA: Primary | ICD-10-CM

## 2018-05-01 RX ORDER — NITROFURANTOIN 25; 75 MG/1; MG/1
100 CAPSULE ORAL 2 TIMES DAILY
Qty: 10 CAPSULE | Refills: 0 | Status: SHIPPED | OUTPATIENT
Start: 2018-05-01 | End: 2018-05-06

## 2018-05-01 NOTE — PROGRESS NOTES
Sarita, are you having symptoms of bladder infection? I'm assuming so, since you came in and left a sample. It is slightly abnormal, would like to talk to you about your symptoms to correlate with what I'm seeing on the results.   Miryam Perez MD

## 2018-05-03 LAB
BACTERIA SPEC CULT: ABNORMAL
BACTERIA SPEC CULT: ABNORMAL
Lab: ABNORMAL
SPECIMEN SOURCE: ABNORMAL

## 2018-05-04 NOTE — PROGRESS NOTES
Sarita, you actually have 2 strains of E.coli in your bladder causing an infection. Fortunately, the medication I put you on should take care of it.  If you have any ongoing symptoms after finishing the medication, please leave another sample and we'll recheck it.   Miryam Perez MD

## 2018-05-15 ENCOUNTER — TELEPHONE (OUTPATIENT)
Dept: OBGYN | Facility: CLINIC | Age: 28
End: 2018-05-15

## 2018-05-15 ENCOUNTER — TRANSFERRED RECORDS (OUTPATIENT)
Dept: HEALTH INFORMATION MANAGEMENT | Facility: CLINIC | Age: 28
End: 2018-05-15

## 2018-05-16 NOTE — TELEPHONE ENCOUNTER
Please call patient and ask how she is feeling today.  Any ongoing cramps?  Any bleeding?  Is baby active?  Miryam Perez MD

## 2018-05-16 NOTE — TELEPHONE ENCOUNTER
"Call to patient to inquirer on symptoms and if she is feeling okay today? Patient stating she did go to the [place] ED last evening. Patient stating they did run a urine sample which was fine, but did also run a culture and that Dr. Perez should be able to access for results. Patient stating she was given IV fluids and cramping stopped. She was told to follow up with Dr. Perez in a week. Questioned if she is having any cramping or issues today? Patient stating \"no, I am drinking extra fluids and taking it easy, no issues of concern today.\" Patient scheduled for follow up on 5/23 at 3:45 pm. Patient did ask to be called when her urine culture results are in. Informed patient that Dr. Perez may need to check in Care Everywhere for results and that we will watch for faxes as well. Informed we will update Dr. Perez. Lizeth Cavazos LPN    "

## 2018-05-17 ENCOUNTER — TELEPHONE (OUTPATIENT)
Dept: FAMILY MEDICINE | Facility: CLINIC | Age: 28
End: 2018-05-17

## 2018-05-18 NOTE — TELEPHONE ENCOUNTER
S: Triage phone call    B: Eden is a 27 y.o.  @ 25w 5d who called to update regarding previous uterine cramping .    A: Refer to previous call at 1740. Eden called to update regarding uterine cramping related to UTI diagnosis. She has taken tylenol and rested. She is feeling improvement with resting. If she is up and moving, she feels the cramping return. She continues to deny contractions and states that the baby is active. She was reassured that her cramping symptoms should decrease after 24 hours of antibiotic treatment.    R: Eden's plan is to continue resting, using tylenol as needed and hydrate well. She will [facility] the [facility] for evaluation if she is concerned about symptoms or she begins to contract.

## 2018-05-18 NOTE — TELEPHONE ENCOUNTER
S: Triage phone call    B: Eden is a 27 y.o.  @ 25w 5d who called the [facility] with complaint of uterine cramping.    A: Eden called stating that she was seen yesterday at the [facility] for uterine cramping and was diagnosed with a UTI. She was given IV fluids and a script for an antibiotic which she started this evening around 1600. She was calling concerned that she was having uterine cramping and low back ache again this evening. Baby has been active. She denies contractions.    R: She was encouraged to take some tylenol, increase her fluids and rest. She will call back to the [facility] to update later this evening or will come to the [facility]thing Center for evaluation if her symptoms worsen.

## 2018-05-21 ENCOUNTER — TELEPHONE (OUTPATIENT)
Dept: OBGYN | Facility: CLINIC | Age: 28
End: 2018-05-21

## 2018-05-21 NOTE — TELEPHONE ENCOUNTER
"Eden is a 27 y.o.  pt who is 26w2d pregnant. She called in very upset and crying. She had just received bad news regarding her grandfather's health. He is being hospitalized in [place]. She said her stomach felt \"tight\", but she wasn't sure it was contractions. She seemed concerned that emotional upset would be bad for the baby or throw her into labor. Denied leaking of fluid, bleeding, and pain. Baby's movement was good. Tried to reassure her that the pregnancy was likely not affected from the news of her grandfather. Suggested she drink 2-3 large glasses of water, lay on her left side, and try to count any \"tightenings\" in her abdomen over the next hour. Instructed her to call back with a report in 1 hour. Pt never called back.   "

## 2018-05-23 ENCOUNTER — PRENATAL OFFICE VISIT (OUTPATIENT)
Dept: FAMILY MEDICINE | Facility: CLINIC | Age: 28
End: 2018-05-23

## 2018-05-23 VITALS
WEIGHT: 150 LBS | DIASTOLIC BLOOD PRESSURE: 52 MMHG | TEMPERATURE: 98.1 F | SYSTOLIC BLOOD PRESSURE: 100 MMHG | BODY MASS INDEX: 25.75 KG/M2 | HEART RATE: 100 BPM | OXYGEN SATURATION: 99 %

## 2018-05-23 DIAGNOSIS — Z98.891 PREVIOUS CESAREAN SECTION: ICD-10-CM

## 2018-05-23 DIAGNOSIS — Z34.81 ENCOUNTER FOR SUPERVISION OF OTHER NORMAL PREGNANCY IN FIRST TRIMESTER: Primary | ICD-10-CM

## 2018-05-23 PROCEDURE — 99207 ZZC PRENATAL VISIT: CPT | Performed by: FAMILY MEDICINE

## 2018-05-23 RX ORDER — NITROFURANTOIN 25; 75 MG/1; MG/1
CAPSULE ORAL
Refills: 0 | COMMUNITY
Start: 2018-05-17 | End: 2018-06-08

## 2018-05-23 ASSESSMENT — PAIN SCALES - GENERAL: PAINLEVEL: NO PAIN (0)

## 2018-05-23 NOTE — MR AVS SNAPSHOT
After Visit Summary   2018    Eden Yanez    MRN: [id number]           Patient Information     Date Of Birth          1990        Visit Information        Provider Department      2018 3:45 PM Miryam Perez MD [facility]        Today's Diagnoses     Encounter for supervision of other normal pregnancy in first trimester    -  1    Previous  section           Follow-ups after your visit        Your next 10 appointments already scheduled     2018  9:45 AM CDT   ESTABLISHED PRENATAL with Miryam Perez MD   [address] [address]   [phone]            2018 10:00 AM CDT   ESTABLISHED PRENATAL with Raheem Batista MD   [facility] ([address] [address]   [phone]            2018 11:00 AM CDT   ESTABLISHED PRENATAL with Miryam Perez MD   [facility] ([address] [address]   [phone]            2018 11:00 AM CDT   ESTABLISHED PRENATAL with Miryam Perez MD   [facility] ([address] [address]   [phone]            2018 11:00 AM CDT   ESTABLISHED PRENATAL with Miryam Perez MD   [facility] ([address] [address] [phone]              Who to contact     If you have questions or need follow up information about today's clinic visit or your schedule please contact [facility] directly at [phone].  Normal or non-critical lab and imaging results will be communicated to you by MyChart, letter or phone within 4 business days after the clinic has received  the results. If you do not hear from us within 7 days, please contact the clinic through [employer] or phone. If you have a critical or abnormal lab result, we will notify you by phone as soon as possible.  Submit refill requests through [employer] or call your pharmacy and they will forward the refill request to us. Please allow 3 business days for your refill to be completed.          Additional Information About Your Visit        [employer] Information     [employer] gives you secure access to your electronic health record. If you see a primary care provider, you can also send messages to your care team and make appointments. If you have questions, please call your primary care clinic.  If you do not have a primary care provider, please call [phone] and they will assist you.        Care EveryWhere ID     This is your Care EveryWhere ID. This could be used by other organizations to access your [place] medical records  [id number]        Your Vitals Were     Pulse Temperature Last Period Pulse Oximetry BMI (Body Mass Index)       100 98.1  F (36.7  C) (Temporal) 11/18/2017 (Approximate) 99% 25.75 kg/m2        Blood Pressure from Last 3 Encounters:   05/23/18 100/52   04/13/18 118/48   03/07/18 92/46    Weight from Last 3 Encounters:   05/23/18 150 lb (68 kg)   04/13/18 145 lb 3.2 oz (65.9 kg)   03/07/18 136 lb 12.8 oz (62.1 kg)              Today, you had the following     No orders found for display       Primary Care Provider Office Phone # Fax #    Miryam Anay Perez [phone]        [facility] [address] [address] [place] [address]        Equal Access to Services     [facility] AH: Hadii michael Polanco, waaxda luqadaha, qaybta kaaljocelynn yan. So [facility] [phone].    ATENCIÓN: Si habla español, tiene a funez disposición servicios gratuitos de asistencia lingüística. Lino al [phone].    We comply with applicable federal civil rights laws and  [place] laws. We do not discriminate on the basis of race, color, national origin, age, disability, sex, sexual orientation, or gender identity.            Thank you!     Thank you for choosing [facility]  for your care. Our goal is always to provide you with excellent care. Hearing back from our patients is one way we can continue to improve our services. Please take a few minutes to complete the written survey that you may receive in the mail after your visit with us. Thank you!             Your Updated Medication List - Protect others around you: Learn how to safely use, store and throw away your medicines at www.disposemymeds.org.          This list is accurate as of 5/23/18  4:25 PM.  Always use your most recent med list.                   Brand Name Dispense Instructions for use Diagnosis    nitroFURantoin (macrocrystal-monohydrate) 100 MG capsule    MACROBID     TK 1 C PO BID        OMEGA DHA PO           prenatal multivitamin plus iron 27-0.8 MG Tabs per tablet      Take 1 tablet by mouth daily

## 2018-05-23 NOTE — PROGRESS NOTES
Sarita is here to follow up after recent ED visit for pelvic cramping. She was seen in [place] in [place] ([facility]).  She was diagnosed with UTI, culture showed e.coli susceptible to the antibiotics she was put on, Macrodantin.    She feels better with no ongoing UTI symptoms.  She had a little cramping last night.  No bleeding.    No headaches, dizziness, visual blurriness, nausea, emesis, or other associated symptoms.   She has been concerned about the effects of stress on the baby. Her grandfather is ill, and it has really stressed her.  She worried this will hurt the baby. We discussed the risks of stress causing  labor or lack of sleep, fatigue, dehydration or poor diet, and the effects of these issues on the baby.  If she handles her stress well and takes care of herself, the effect on the pregnancy/baby should be minimal.    Will do 1 hour GTT, Hgb, and RPR at next visit in 2 weeks.   She has consult for  with Dr. Batista next month.   Standing order for UA in chart in case of recurrent symptoms.   Also discussed availability of birthplace for urgent eval any time through the remainder of her pregnancy.   Miryam Perez MD

## 2018-05-31 ENCOUNTER — MYC MEDICAL ADVICE (OUTPATIENT)
Dept: FAMILY MEDICINE | Facility: CLINIC | Age: 28
End: 2018-05-31

## 2018-06-08 ENCOUNTER — PRENATAL OFFICE VISIT (OUTPATIENT)
Dept: FAMILY MEDICINE | Facility: CLINIC | Age: 28
End: 2018-06-08

## 2018-06-08 VITALS
TEMPERATURE: 97.6 F | DIASTOLIC BLOOD PRESSURE: 54 MMHG | SYSTOLIC BLOOD PRESSURE: 100 MMHG | WEIGHT: 151.4 LBS | HEART RATE: 110 BPM | BODY MASS INDEX: 25.99 KG/M2 | OXYGEN SATURATION: 99 % | RESPIRATION RATE: 16 BRPM

## 2018-06-08 DIAGNOSIS — D50.9 IRON DEFICIENCY ANEMIA, UNSPECIFIED IRON DEFICIENCY ANEMIA TYPE: ICD-10-CM

## 2018-06-08 DIAGNOSIS — O26.899 RH NEGATIVE STATE IN ANTEPARTUM PERIOD: ICD-10-CM

## 2018-06-08 DIAGNOSIS — Z67.91 RH NEGATIVE STATE IN ANTEPARTUM PERIOD: ICD-10-CM

## 2018-06-08 DIAGNOSIS — Z34.83 ENCOUNTER FOR SUPERVISION OF OTHER NORMAL PREGNANCY IN THIRD TRIMESTER: Primary | ICD-10-CM

## 2018-06-08 DIAGNOSIS — Z98.891 PREVIOUS CESAREAN SECTION: ICD-10-CM

## 2018-06-08 LAB
ABO + RH BLD: NORMAL
ABO + RH BLD: NORMAL
GLUCOSE 1H P 50 G GLC PO SERPL-MCNC: 150 MG/DL (ref 60–129)
HGB BLD-MCNC: 11.5 G/DL (ref 11.7–15.7)
SPECIMEN EXP DATE BLD: NORMAL

## 2018-06-08 PROCEDURE — 82950 GLUCOSE TEST: CPT | Performed by: FAMILY MEDICINE

## 2018-06-08 PROCEDURE — 99207 ZZC PRENATAL VISIT: CPT | Mod: 25 | Performed by: FAMILY MEDICINE

## 2018-06-08 PROCEDURE — 36415 COLL VENOUS BLD VENIPUNCTURE: CPT | Performed by: FAMILY MEDICINE

## 2018-06-08 PROCEDURE — 86901 BLOOD TYPING SEROLOGIC RH(D): CPT | Performed by: FAMILY MEDICINE

## 2018-06-08 PROCEDURE — 86780 TREPONEMA PALLIDUM: CPT | Performed by: FAMILY MEDICINE

## 2018-06-08 PROCEDURE — 86900 BLOOD TYPING SEROLOGIC ABO: CPT | Performed by: FAMILY MEDICINE

## 2018-06-08 PROCEDURE — 00000218 ZZHCL STATISTIC OBHBG - HEMOGLOBIN: Performed by: FAMILY MEDICINE

## 2018-06-08 PROCEDURE — 96372 THER/PROPH/DIAG INJ SC/IM: CPT | Performed by: FAMILY MEDICINE

## 2018-06-08 RX ORDER — FERROUS SULFATE 325(65) MG
325 TABLET ORAL
Qty: 90 TABLET | Refills: 1 | Status: ON HOLD | OUTPATIENT
Start: 2018-06-08 | End: 2018-08-22

## 2018-06-08 RX ORDER — LORATADINE 10 MG/1
10 TABLET ORAL DAILY
COMMUNITY
End: 2019-03-01

## 2018-06-08 NOTE — NURSING NOTE
Screening Questionnaire for Adult Immunization    Are you sick today?   No   Do you have allergies to medications, food, a vaccine component or latex?   Yes   Have you ever had a serious reaction after receiving a vaccination?   No   Do you have a long-term health problem with heart disease, lung disease, asthma, kidney disease, metabolic disease (e.g. diabetes), anemia, or other blood disorder?   No   Do you have cancer, leukemia, HIV/AIDS, or any other immune system problem?   No   In the past 3 months, have you taken medications that affect  your immune system, such as prednisone, other steroids, or anticancer drugs; drugs for the treatment of rheumatoid arthritis, Crohn s disease, or psoriasis; or have you had radiation treatments?   No   Have you had a seizure, or a brain or other nervous system problem?   No   During the past year, have you received a transfusion of blood or blood     products, or been given immune (gamma) globulin or antiviral drug?   No   For women: Are you pregnant or is there a chance you could become        pregnant during the next month?   Yes   Have you received any vaccinations in the past 4 weeks?   No     Immunization questionnaire was positive for at least one answer.  Notified provider.        Per orders of Dr. Perez, injection of Rhogam given by Marisol Zhong. Patient instructed to remain in clinic for 15 minutes afterwards, and to report any adverse reaction to me immediately.       Screening performed by Marisol Zhong on 6/8/2018 at 11:42 AM.

## 2018-06-08 NOTE — MR AVS SNAPSHOT
After Visit Summary   2018    Eden Yanez    MRN: [id number]           Patient Information     Date Of Birth          1990        Visit Information        Provider Department      2018 9:45 AM Miryam Perez MD [facility]        Today's Diagnoses     Encounter for supervision of other normal pregnancy in first trimester    -  1    Rh negative state in antepartum period        Previous  section           Follow-ups after your visit        Your next 10 appointments already scheduled     2018 10:00 AM CDT   ESTABLISHED PRENATAL with Raheem Batista MD   [facility] ([address] [address]   [phone]            2018 11:00 AM CDT   ESTABLISHED PRENATAL with Miryam Perez MD   [facility] ([address] [address]   [phone]            2018 11:00 AM CDT   ESTABLISHED PRENATAL with Miryam Perez MD   [facility] ([facility])    [address] [place] [address]   [phone]            2018 11:00 AM CDT   ESTABLISHED PRENATAL with Miryam Perez MD   [facility] ([address] [address]   [phone]              Who to contact     If you have questions or need follow up information about today's clinic visit or your schedule please contact [facility] directly at [phone].  Normal or non-critical lab and imaging results will be communicated to you by MyChart, letter or phone within 4 business days after the clinic has received the results. If you do not hear from us within 7 days, please contact the clinic through MyChart or phone. If you have a critical or abnormal lab result, we will notify you  by phone as soon as possible.  Submit refill requests through [employer] or call your pharmacy and they will forward the refill request to us. Please allow 3 business days for your refill to be completed.          Additional Information About Your Visit        Location [employer] Information     [employer] gives you secure access to your electronic health record. If you see a primary care provider, you can also send messages to your care team and make appointments. If you have questions, please call your primary care clinic.  If you do not have a primary care provider, please call [phone] and they will assist you.        Care EveryWhere ID     This is your Care EveryWhere ID. This could be used by other organizations to access your [place] medical records  [id number]        Your Vitals Were     Pulse Temperature Respirations Last Period Pulse Oximetry BMI (Body Mass Index)    110 97.6  F (36.4  C) (Temporal) 16 11/18/2017 (Approximate) 99% 25.99 kg/m2       Blood Pressure from Last 3 Encounters:   06/08/18 100/54   05/23/18 100/52   04/13/18 118/48    Weight from Last 3 Encounters:   06/08/18 151 lb 6.4 oz (68.7 kg)   05/23/18 150 lb (68 kg)   04/13/18 145 lb 3.2 oz (65.9 kg)              We Performed the Following     ABO and Rh     ADMIN 1st VACCINE     Glucose tolerance, gest screen, 1 hour     OB hemoglobin     RHO D IMMUNE GLOBULIN, FULL DOSE 300 MCG     Treponema Abs w Reflex to RPR and Titer        Primary Care Provider Office Phone # Fax #    Miryam Anay Perez [phone]       [address] [place] [address]        Equal Access to Services     [facility]: Hadii aad ku hadasho Soomaali, waaxda luqadaha, qaybta kaalmada dennysegmarcin, jocelynn fernández. So [facility] [phone].    ATENCIÓN: Si habla español, tiene a funez disposición servicios gratuitos de asistencia lingüística. Llame al [phone].    We comply with applicable federal civil rights laws and [place]  laws. We do not discriminate on the basis of race, color, national origin, age, disability, sex, sexual orientation, or gender identity.            Thank you!     Thank you for choosing [facility]  for your care. Our goal is always to provide you with excellent care. Hearing back from our patients is one way we can continue to improve our services. Please take a few minutes to complete the written survey that you may receive in the mail after your visit with us. Thank you!             Your Updated Medication List - Protect others around you: Learn how to safely use, store and throw away your medicines at www.disposemymeds.org.          This list is accurate as of 6/8/18 12:24 PM.  Always use your most recent med list.                   Brand Name Dispense Instructions for use Diagnosis    loratadine 10 MG tablet    CLARITIN     Take 10 mg by mouth daily        OMEGA DHA PO           prenatal multivitamin plus iron 27-0.8 MG Tabs per tablet      Take 1 tablet by mouth daily

## 2018-06-08 NOTE — PROGRESS NOTES
Doing well overall.  No bleeding, no regular ctx  No headaches, visual blurriness, nausea, emesis, or other associated symptoms. Patient said she is feeling a little dizzy today, maybe from the glucola.  Rhogam given.   1 hour GTT, HGB and RPR pending, and cervix check done today as noted in OB flow sheet   Discussed  labor.  Follow up in 4 weeks or sooner if needed.  Miryam Perez MD

## 2018-06-09 LAB — T PALLIDUM AB SER QL: NONREACTIVE

## 2018-06-11 ENCOUNTER — TELEPHONE (OUTPATIENT)
Dept: FAMILY MEDICINE | Facility: CLINIC | Age: 28
End: 2018-06-11

## 2018-06-11 DIAGNOSIS — Z34.81 ENCOUNTER FOR SUPERVISION OF OTHER NORMAL PREGNANCY IN FIRST TRIMESTER: Primary | ICD-10-CM

## 2018-06-11 NOTE — TELEPHONE ENCOUNTER
----- Message from Miryam Perez MD sent at 6/8/2018  3:47 PM CDT -----  See message to patient. Please arrange 3 hour GTT.   Miryam Perez MD

## 2018-06-11 NOTE — TELEPHONE ENCOUNTER
Discussed with patient what time she can come in for 3 hour GTT. Patient will check her schedule and call back. Please assist in setting this up for patient.  Marisol Zhong CMA

## 2018-06-13 DIAGNOSIS — Z34.81 ENCOUNTER FOR SUPERVISION OF OTHER NORMAL PREGNANCY IN FIRST TRIMESTER: ICD-10-CM

## 2018-06-13 LAB
GLUCOSE 1H P 100 G GLC PO SERPL-MCNC: 136 MG/DL (ref 60–179)
GLUCOSE 2H P 100 G GLC PO SERPL-MCNC: 104 MG/DL (ref 60–154)
GLUCOSE 3H P 100 G GLC PO SERPL-MCNC: 89 MG/DL (ref 60–139)
GLUCOSE P FAST SERPL-MCNC: 79 MG/DL (ref 60–94)

## 2018-06-13 PROCEDURE — 36415 COLL VENOUS BLD VENIPUNCTURE: CPT | Performed by: FAMILY MEDICINE

## 2018-06-13 PROCEDURE — 82951 GLUCOSE TOLERANCE TEST (GTT): CPT | Performed by: FAMILY MEDICINE

## 2018-06-13 PROCEDURE — 82952 GTT-ADDED SAMPLES: CPT | Performed by: FAMILY MEDICINE

## 2018-06-16 PROBLEM — Z34.81 ENCOUNTER FOR SUPERVISION OF OTHER NORMAL PREGNANCY IN FIRST TRIMESTER: Status: RESOLVED | Noted: 2018-02-19 | Resolved: 2018-06-16

## 2018-06-16 PROBLEM — Z34.83 ENCOUNTER FOR SUPERVISION OF OTHER NORMAL PREGNANCY IN THIRD TRIMESTER: Status: ACTIVE | Noted: 2018-06-16

## 2018-06-22 ENCOUNTER — PRENATAL OFFICE VISIT (OUTPATIENT)
Dept: FAMILY MEDICINE | Facility: CLINIC | Age: 28
End: 2018-06-22

## 2018-06-22 VITALS
BODY MASS INDEX: 26.29 KG/M2 | RESPIRATION RATE: 14 BRPM | WEIGHT: 154 LBS | DIASTOLIC BLOOD PRESSURE: 60 MMHG | OXYGEN SATURATION: 100 % | HEIGHT: 64 IN | SYSTOLIC BLOOD PRESSURE: 104 MMHG | TEMPERATURE: 96.1 F | HEART RATE: 60 BPM

## 2018-06-22 DIAGNOSIS — Z34.93 ENCOUNTER FOR PREGNANCY RELATED EXAMINATION IN THIRD TRIMESTER: Primary | ICD-10-CM

## 2018-06-22 PROCEDURE — 99207 ZZC PRENATAL VISIT: CPT | Performed by: OBSTETRICS & GYNECOLOGY

## 2018-06-22 ASSESSMENT — PAIN SCALES - GENERAL: PAINLEVEL: NO PAIN (0)

## 2018-06-22 NOTE — MR AVS SNAPSHOT
After Visit Summary   6/22/2018    Eden Yanez    MRN: [id number]           Patient Information     Date Of Birth          1990        Visit Information        Provider Department      6/22/2018 10:00 AM Raheem Batista MD [facility]        Today's Diagnoses     Encounter for pregnancy related examination in third trimester    -  1       Follow-ups after your visit        Your next 10 appointments already scheduled     Jun 22, 2018 11:00 AM CDT   ESTABLISHED PRENATAL with Miryam Perez MD   [facility] ([address] [address]   [phone]            Jul 06, 2018 11:00 AM CDT   ESTABLISHED PRENATAL with Miryam Perez MD   [facility] ([address] [address]   [phone]            Jul 18, 2018 11:00 AM CDT   ESTABLISHED PRENATAL with Miryam Perez MD   [facility] ([address] [address]   [phone]            Aug 03, 2018 10:00 AM CDT   ESTABLISHED PRENATAL with Raheem Batista MD   [facility] ([address] [address] [phone]              Who to contact     If you have questions or need follow up information about today's clinic visit or your schedule please contact [facility] directly at [phone].  Normal or non-critical lab and imaging results will be communicated to you by MyChart, letter or phone within 4 business days after the clinic has received the results. If you do not hear from us within 7 days, please contact the clinic through MyChart or phone. If you have a critical or abnormal lab result, we will notify you by phone as soon as possible.  Submit refill requests through [employer] or call your  "pharmacy and they will forward the refill request to us. Please allow 3 business days for your refill to be completed.          Additional Information About Your Visit        MyChart Information     [employer] gives you secure access to your electronic health record. If you see a primary care provider, you can also send messages to your care team and make appointments. If you have questions, please call your primary care clinic.  If you do not have a primary care provider, please call [phone] and they will assist you.        Care EveryWhere ID     This is your Care EveryWhere ID. This could be used by other organizations to access your [place] medical records  [id number]        Your Vitals Were     Pulse Temperature Respirations Height Last Period Pulse Oximetry    60 96.1  F (35.6  C) (Temporal) 14 5' 4\" (1.626 m) 11/18/2017 (Approximate) 100%    Breastfeeding? BMI (Body Mass Index)                No 26.43 kg/m2           Blood Pressure from Last 3 Encounters:   06/22/18 104/60   06/08/18 100/54   05/23/18 100/52    Weight from Last 3 Encounters:   06/22/18 154 lb (69.9 kg)   06/08/18 151 lb 6.4 oz (68.7 kg)   05/23/18 150 lb (68 kg)              Today, you had the following     No orders found for display       Primary Care Provider Office Phone # Fax #    Miryam Anay Perez [phone]       [address] [place] [address]        Equal Access to Services     [facility]: Hadii aad ku hadasho Soomaali, waaxda luqadaha, qaybta kaalmada adeegyada, waxay jeanna massey . So [facility] [phone].    ATENCIÓN: Si habla español, tiene a funez disposición servicios gratuitos de asistencia lingüística. Llconner al [phone].    We comply with applicable federal civil rights laws and [place] laws. We do not discriminate on the basis of race, color, national origin, age, disability, sex, sexual orientation, or gender identity.            Thank you!     Thank you for " choosing [facility]  for your care. Our goal is always to provide you with excellent care. Hearing back from our patients is one way we can continue to improve our services. Please take a few minutes to complete the written survey that you may receive in the mail after your visit with us. Thank you!             Your Updated Medication List - Protect others around you: Learn how to safely use, store and throw away your medicines at www.disposemymeds.org.          This list is accurate as of 6/22/18 10:46 AM.  Always use your most recent med list.                   Brand Name Dispense Instructions for use Diagnosis    ferrous sulfate 325 (65 Fe) MG tablet    IRON    90 tablet    Take 1 tablet (325 mg) by mouth daily (with breakfast)    Iron deficiency anemia, unspecified iron deficiency anemia type       loratadine 10 MG tablet    CLARITIN     Take 10 mg by mouth daily        OMEGA DHA PO           prenatal multivitamin plus iron 27-0.8 MG Tabs per tablet      Take 1 tablet by mouth daily

## 2018-06-22 NOTE — PROGRESS NOTES
She reports daily fetal movement and no concerns.  Prenatal flowsheet info reviewed as listed above.  We discussed plans for repeat CS. She does not want tubal ligation. She had a prior uterine inversion as the placenta was firmly attached.  I told her she is at risk for this happening again and we will be aware of this and prepared.  There is a small risk of needing a hysterectomy if the placenta is firmly attached and cannot be cleaved.  She will see me again in early August.  Repeat  planned for  at 7:30 AM,  She will see Dr. Perez again in 2 weeks.RICARDA Batista MD

## 2018-06-29 ENCOUNTER — TELEPHONE (OUTPATIENT)
Dept: FAMILY MEDICINE | Facility: CLINIC | Age: 28
End: 2018-06-29

## 2018-06-29 ENCOUNTER — HOSPITAL ENCOUNTER (INPATIENT)
Facility: CLINIC | Age: 28
Setting detail: SURGERY ADMIT
End: 2018-06-29
Attending: OBSTETRICS & GYNECOLOGY | Admitting: OBSTETRICS & GYNECOLOGY

## 2018-06-29 DIAGNOSIS — Z01.812 PRE-PROCEDURE LAB EXAM: Primary | ICD-10-CM

## 2018-06-29 NOTE — TELEPHONE ENCOUNTER
Type of surgery: Repeat Low Transverse  Section  Location of surgery: [facility] OR  Date and time of surgery: Aug. 20th at 7:30 am  Surgeon: Lynne Perez assisting  Pre-Op Appt Date: 8/15/2018  Post-Op Appt Date:    Packet sent out: No, give at Consent Signing appt on 8/3/18 with Dr. Batista  Pre-cert/Authorization completed:  Not Applicable  Date: 2018 Lizeth Cavazos LPN

## 2018-07-09 ENCOUNTER — TELEPHONE (OUTPATIENT)
Dept: FAMILY MEDICINE | Facility: CLINIC | Age: 28
End: 2018-07-09

## 2018-07-09 NOTE — TELEPHONE ENCOUNTER
Reason for Call:  Same Day Appointment, Requested Provider:  Miryam Perez M.D.    PCP: Miryam Perez    Reason for visit: OB appt    Duration of symptoms:     Have you been treated for this in the past? Yes    Additional comments: pt missed her appt last Friday      Can we leave a detailed message on this number? YES    Phone number patient can be reached at:     Best Time:   Call taken on 7/9/2018 at 9:47 AM by Thi Chang

## 2018-07-09 NOTE — TELEPHONE ENCOUNTER
I'll see her today if she would like to come, otherwise any time this week. If she is doing well, no concerns, I can see her at her next scheduled visit on 7/18.   Miryam Perez MD

## 2018-07-11 ENCOUNTER — PRENATAL OFFICE VISIT (OUTPATIENT)
Dept: FAMILY MEDICINE | Facility: CLINIC | Age: 28
End: 2018-07-11

## 2018-07-11 VITALS
RESPIRATION RATE: 16 BRPM | HEART RATE: 115 BPM | DIASTOLIC BLOOD PRESSURE: 60 MMHG | TEMPERATURE: 97.2 F | SYSTOLIC BLOOD PRESSURE: 112 MMHG | OXYGEN SATURATION: 97 % | WEIGHT: 161 LBS | BODY MASS INDEX: 27.64 KG/M2

## 2018-07-11 DIAGNOSIS — R82.90 ABNORMAL URINE ODOR: ICD-10-CM

## 2018-07-11 DIAGNOSIS — Z98.891 PREVIOUS CESAREAN SECTION: ICD-10-CM

## 2018-07-11 DIAGNOSIS — Z34.83 ENCOUNTER FOR SUPERVISION OF OTHER NORMAL PREGNANCY IN THIRD TRIMESTER: Primary | ICD-10-CM

## 2018-07-11 LAB
ALBUMIN UR-MCNC: NEGATIVE MG/DL
APPEARANCE UR: ABNORMAL
BACTERIA #/AREA URNS HPF: ABNORMAL /HPF
BILIRUB UR QL STRIP: NEGATIVE
COLOR UR AUTO: YELLOW
GLUCOSE UR STRIP-MCNC: NEGATIVE MG/DL
HGB UR QL STRIP: NEGATIVE
KETONES UR STRIP-MCNC: NEGATIVE MG/DL
LEUKOCYTE ESTERASE UR QL STRIP: ABNORMAL
MUCOUS THREADS #/AREA URNS LPF: PRESENT /LPF
NITRATE UR QL: POSITIVE
PH UR STRIP: 6 PH (ref 5–7)
RBC #/AREA URNS AUTO: 2 /HPF (ref 0–2)
SOURCE: ABNORMAL
SP GR UR STRIP: 1.01 (ref 1–1.03)
SQUAMOUS #/AREA URNS AUTO: 29 /HPF (ref 0–1)
UROBILINOGEN UR STRIP-MCNC: 0 MG/DL (ref 0–2)
WBC #/AREA URNS AUTO: 23 /HPF (ref 0–5)

## 2018-07-11 PROCEDURE — 87088 URINE BACTERIA CULTURE: CPT | Performed by: FAMILY MEDICINE

## 2018-07-11 PROCEDURE — 87086 URINE CULTURE/COLONY COUNT: CPT | Performed by: FAMILY MEDICINE

## 2018-07-11 PROCEDURE — 99207 ZZC PRENATAL VISIT: CPT | Performed by: FAMILY MEDICINE

## 2018-07-11 PROCEDURE — 87186 SC STD MICRODIL/AGAR DIL: CPT | Performed by: FAMILY MEDICINE

## 2018-07-11 PROCEDURE — 81001 URINALYSIS AUTO W/SCOPE: CPT | Performed by: FAMILY MEDICINE

## 2018-07-11 NOTE — MR AVS SNAPSHOT
After Visit Summary   7/11/2018    Eden Yanez    MRN: [id number]           Patient Information     Date Of Birth          1990        Visit Information        Provider Department      7/11/2018 2:00 PM Miryam Perez MD [facility]        Today's Diagnoses     Abnormal urine odor           Follow-ups after your visit        Your next 10 appointments already scheduled     Jul 18, 2018 11:00 AM CDT   ESTABLISHED PRENATAL with Miryam Perez MD   [address] [address]   [phone]            Jul 25, 2018 11:45 AM CDT   ESTABLISHED PRENATAL with Miryam Perez MD   [address] [address]   [phone]            Aug 03, 2018 10:00 AM CDT   ESTABLISHED PRENATAL with Raheem Batista MD   [facility] ([address] [address]   [phone]            Aug 10, 2018 10:15 AM CDT   ESTABLISHED PRENATAL with Miryam Perez MD   [facility] ([address] [address]   [phone]            Aug 15, 2018 11:30 AM CDT   ESTABLISHED PRENATAL with Miryam Perez MD   [address] [address]   [phone]            Aug 19, 2018 11:00 AM CDT   LAB with NL LAB [address] [address]   [phone]           Please do not eat 10-12 hours before your appointment if you are coming in fasting for labs on lipids, cholesterol, or glucose (sugar). This does not apply to pregnant women. Water, hot tea and black coffee (with nothing added) are okay. Do not drink  other fluids, diet soda or chew gum.            Aug 20, 2018   Procedure with Raheem Batista MD   [facility] ([facility])    [address] [place] [id number] [phone]           From [address]: Exit at [place] on south side of [place]. Turn right on [address]. Turn left at stoplight on [address]. [facility] will be in view two blocks ahead              Who to contact     If you have questions or need follow up information about today's clinic visit or your schedule please contact [facility] directly at [phone].  Normal or non-critical lab and imaging results will be communicated to you by Prot-Onhart, letter or phone within 4 business days after the clinic has received the results. If you do not hear from us within 7 days, please contact the clinic through Gamma 2 Roboticst or phone. If you have a critical or abnormal lab result, we will notify you by phone as soon as possible.  Submit refill requests through Synerscope or call your pharmacy and they will forward the refill request to us. Please allow 3 business days for your refill to be completed.          Additional Information About Your Visit        Prot-OnharNamely Information     Synerscope gives you secure access to your electronic health record. If you see a primary care provider, you can also send messages to your care team and make appointments. If you have questions, please call your primary care clinic.  If you do not have a primary care provider, please call [phone] and they will assist you.        Care EveryWhere ID     This is your Care EveryWhere ID. This could be used by other organizations to access your [place] medical records  [id number]        Your Vitals Were     Pulse Temperature Respirations Last Period Pulse Oximetry BMI (Body Mass Index)    115 97.2  F (36.2  C) (Temporal) 16 11/18/2017 (Approximate) 97% 27.64 kg/m2       Blood Pressure from Last 3  Encounters:   07/11/18 112/60   06/22/18 104/60   06/08/18 100/54    Weight from Last 3 Encounters:   07/11/18 161 lb (73 kg)   06/22/18 154 lb (69.9 kg)   06/08/18 151 lb 6.4 oz (68.7 kg)              We Performed the Following     *UA reflex to Microscopic and Culture ([place]; [facility]; [facility]; [facility]; [facility]; [facility]; [facility] [place]; [place])     Urine Culture Aerobic Bacterial        Primary Care Provider Office Phone # Fax #    Miryam Anay Perez [phone] [address] [place] [address]        Equal Access to Services     YADI PATEL : Chapin bosso Soisauraali, waaxda luqadaha, qaybta kaalmada adeegyada, jocelynn fernández. So [facility] [phone].    ATENCIÓN: Si habla español, tiene a funez disposición servicios gratuitos de asistencia lingüística. Llame al [phone].    We comply with applicable federal civil rights laws and [place] laws. We do not discriminate on the basis of race, color, national origin, age, disability, sex, sexual orientation, or gender identity.            Thank you!     Thank you for choosing [facility]  for your care. Our goal is always to provide you with excellent care. Hearing back from our patients is one way we can continue to improve our services. Please take a few minutes to complete the written survey that you may receive in the mail after your visit with us. Thank you!             Your Updated Medication List - Protect others around you: Learn how to safely use, store and throw away your medicines at www.disposemymeds.org.          This list is accurate as of 7/11/18  3:15 PM.  Always use your most recent med list.                   Brand Name Dispense Instructions for use Diagnosis    ferrous sulfate 325 (65 Fe) MG tablet    IRON    90 tablet    Take 1 tablet (325 mg) by mouth daily (with breakfast)    Iron deficiency anemia, unspecified iron deficiency anemia  type       loratadine 10 MG tablet    CLARITIN     Take 10 mg by mouth daily        OMEGA DHA PO           prenatal multivitamin plus iron 27-0.8 MG Tabs per tablet      Take 1 tablet by mouth daily

## 2018-07-12 NOTE — PROGRESS NOTES
Doing well overall.  No bleeding, no regular ctx.   She is tired, but feels this is normal for stage in pregnancy.    Had consult for surgery, planned for .    She still notes occasional odd smell to her urine, more so at night.  She is drinking plenty of water.    Will run UA today.   Discussed  labor.   Discussed warning signs for preeclampsia.  Will f/u in 1 week or sooner with any concern for decreased fetal movement, vaginal bleeding, fluid leak, headache, vision change, severe nausea or vomiting, abdominal pain, increased edema or other concerns.   Miryam Perez MD

## 2018-07-13 ENCOUNTER — MYC MEDICAL ADVICE (OUTPATIENT)
Dept: FAMILY MEDICINE | Facility: CLINIC | Age: 28
End: 2018-07-13

## 2018-07-13 DIAGNOSIS — N30.00 ACUTE CYSTITIS WITHOUT HEMATURIA: Primary | ICD-10-CM

## 2018-07-13 LAB
BACTERIA SPEC CULT: ABNORMAL
Lab: ABNORMAL
SPECIMEN SOURCE: ABNORMAL

## 2018-07-13 RX ORDER — NITROFURANTOIN 25; 75 MG/1; MG/1
100 CAPSULE ORAL 2 TIMES DAILY
Qty: 10 CAPSULE | Refills: 0 | Status: SHIPPED | OUTPATIENT
Start: 2018-07-13 | End: 2018-07-18

## 2018-07-20 ENCOUNTER — PRENATAL OFFICE VISIT (OUTPATIENT)
Dept: FAMILY MEDICINE | Facility: CLINIC | Age: 28
End: 2018-07-20

## 2018-07-20 VITALS
OXYGEN SATURATION: 99 % | DIASTOLIC BLOOD PRESSURE: 52 MMHG | SYSTOLIC BLOOD PRESSURE: 90 MMHG | TEMPERATURE: 97.3 F | WEIGHT: 162.8 LBS | BODY MASS INDEX: 27.94 KG/M2 | HEART RATE: 88 BPM

## 2018-07-20 DIAGNOSIS — Z34.83 ENCOUNTER FOR SUPERVISION OF OTHER NORMAL PREGNANCY IN THIRD TRIMESTER: Primary | ICD-10-CM

## 2018-07-20 DIAGNOSIS — Z98.891 PREVIOUS CESAREAN SECTION: ICD-10-CM

## 2018-07-20 DIAGNOSIS — R82.90 ABNORMAL URINE ODOR: ICD-10-CM

## 2018-07-20 LAB
ALBUMIN UR-MCNC: NEGATIVE MG/DL
APPEARANCE UR: CLEAR
BILIRUB UR QL STRIP: NEGATIVE
COLOR UR AUTO: NORMAL
GLUCOSE UR STRIP-MCNC: NEGATIVE MG/DL
HGB UR QL STRIP: NEGATIVE
KETONES UR STRIP-MCNC: NEGATIVE MG/DL
LEUKOCYTE ESTERASE UR QL STRIP: NEGATIVE
NITRATE UR QL: NEGATIVE
PH UR STRIP: 7 PH (ref 5–7)
SOURCE: NORMAL
SP GR UR STRIP: 1 (ref 1–1.03)
UROBILINOGEN UR STRIP-MCNC: 0 MG/DL (ref 0–2)

## 2018-07-20 PROCEDURE — 81003 URINALYSIS AUTO W/O SCOPE: CPT | Performed by: FAMILY MEDICINE

## 2018-07-20 PROCEDURE — 99207 ZZC PRENATAL VISIT: CPT | Performed by: FAMILY MEDICINE

## 2018-07-20 ASSESSMENT — PAIN SCALES - GENERAL: PAINLEVEL: NO PAIN (0)

## 2018-07-20 NOTE — PROGRESS NOTES
Just completed course of antibiotics for UTI.  Will recheck urine today. No symptoms.   Doing well.  No concerns today.  No vaginal bleeding, LOF.  Some intermittent tommy shirley.  Discussed kick counts and fetal movement.  Discussed PTL, PROM, and when to call or come in.  Checklist updated, see prenatal flowsheet for details  RTC next Wednesday. Will do GBS at that time.   Erika MORALES  Pt was personally seen, interviewed and examined by me, chart notes edited and I agree with assessment as documented above.  Miryam Perez MD

## 2018-07-20 NOTE — MR AVS SNAPSHOT
After Visit Summary   7/20/2018    Eden Yanez    MRN: [id number]           Patient Information     Date Of Birth          1990        Visit Information        Provider Department      7/20/2018 9:15 AM Miryam Perez MD [facility]        Today's Diagnoses     Abnormal urine odor           Follow-ups after your visit        Your next 10 appointments already scheduled     Jul 25, 2018 11:45 AM CDT   ESTABLISHED PRENATAL with Miryam Perez MD   [facility] ([facility])    [address] [place] [address]   [phone]            Aug 03, 2018 10:00 AM CDT   ESTABLISHED PRENATAL with Raheem Batista MD   [facility] ([address] [address]   [phone]            Aug 10, 2018 10:15 AM CDT   ESTABLISHED PRENATAL with Miryam Perez MD   [facility] ([facility])    [address] [place] [address]   [phone]            Aug 15, 2018 11:30 AM CDT   ESTABLISHED PRENATAL with Miryam Perez MD   [facility] ([facility])    [address] [place] [address]   [phone]            Aug 19, 2018 11:00 AM CDT   LAB with NL LAB [address] [address]   [phone]           Please do not eat 10-12 hours before your appointment if you are coming in fasting for labs on lipids, cholesterol, or glucose (sugar). This does not apply to pregnant women. Water, hot tea and black coffee (with nothing added) are okay. Do not drink other fluids, diet soda or chew gum.            Aug 20, 2018   Procedure with Raheem Batista MD   [facility] ([facility])    [address] [place] [address]    [phone]           From [address]: Exit at [address] on south side of [place]. Turn right on [address]. Turn left at stoplight on [address]. [facility] will be in view two blocks ahead              Who to contact     If you have questions or need follow up information about today's clinic visit or your schedule please contact [facility] directly at [phone].  Normal or non-critical lab and imaging results will be communicated to you by Mobiveryhart, letter or phone within 4 business days after the clinic has received the results. If you do not hear from us within 7 days, please contact the clinic through Riskthinktank or phone. If you have a critical or abnormal lab result, we will notify you by phone as soon as possible.  Submit refill requests through Riskthinktank or call your pharmacy and they will forward the refill request to us. Please allow 3 business days for your refill to be completed.          Additional Information About Your Visit        MobiveryharMy Dentist Information     Riskthinktank gives you secure access to your electronic health record. If you see a primary care provider, you can also send messages to your care team and make appointments. If you have questions, please call your primary care clinic.  If you do not have a primary care provider, please call [phone] and they will assist you.        Care EveryWhere ID     This is your Care EveryWhere ID. This could be used by other organizations to access your [place] medical records  [id number]        Your Vitals Were     Pulse Temperature Last Period Pulse Oximetry BMI (Body Mass Index)       88 97.3  F (36.3  C) (Temporal) 11/18/2017 (Approximate) 99% 27.94 kg/m2        Blood Pressure from Last 3 Encounters:   07/20/18 90/52   07/11/18 112/60   06/22/18 104/60    Weight from Last 3 Encounters:   07/20/18 162 lb 12.8 oz (73.8 kg)   07/11/18 161 lb (73 kg)   06/22/18 154 lb (69.9 kg)              We Performed the Following      *UA reflex to Microscopic and Culture ([place]; [facility]; [facility]; [facility]; [facility]; [facility]; [place]; [place])        Primary Care Provider Office Phone # Fax #    Miryam Anay Perez [phone] [address] [place] [address]        Equal Access to Services     YADI PATEL : Hadii aad ku hadasho Soomaali, waaxda luqadaha, qaybta kaalmada adeegyada, waxay idiin hayaan adeeg kharash la'aan ah. So [facility] [phone].    ATENCIÓN: Si habla español, tiene a funez disposición servicios gratuitos de asistencia lingüística. Llame al [phone].    We comply with applicable federal civil rights laws and [place] laws. We do not discriminate on the basis of race, color, national origin, age, disability, sex, sexual orientation, or gender identity.            Thank you!     Thank you for choosing [facility]  for your care. Our goal is always to provide you with excellent care. Hearing back from our patients is one way we can continue to improve our services. Please take a few minutes to complete the written survey that you may receive in the mail after your visit with us. Thank you!             Your Updated Medication List - Protect others around you: Learn how to safely use, store and throw away your medicines at www.disposemymeds.org.          This list is accurate as of 7/20/18 10:05 AM.  Always use your most recent med list.                   Brand Name Dispense Instructions for use Diagnosis    ferrous sulfate 325 (65 Fe) MG tablet    IRON    90 tablet    Take 1 tablet (325 mg) by mouth daily (with breakfast)    Iron deficiency anemia, unspecified iron deficiency anemia type       loratadine 10 MG tablet    CLARITIN     Take 10 mg by mouth daily        OMEGA DHA PO           prenatal multivitamin plus iron 27-0.8 MG Tabs per tablet      Take 1 tablet by mouth daily

## 2018-07-25 ENCOUNTER — PRENATAL OFFICE VISIT (OUTPATIENT)
Dept: FAMILY MEDICINE | Facility: CLINIC | Age: 28
End: 2018-07-25

## 2018-07-25 VITALS
OXYGEN SATURATION: 99 % | DIASTOLIC BLOOD PRESSURE: 50 MMHG | HEART RATE: 100 BPM | WEIGHT: 163 LBS | BODY MASS INDEX: 27.83 KG/M2 | TEMPERATURE: 98.2 F | SYSTOLIC BLOOD PRESSURE: 98 MMHG | HEIGHT: 64 IN | RESPIRATION RATE: 18 BRPM

## 2018-07-25 DIAGNOSIS — Z98.891 PREVIOUS CESAREAN SECTION: ICD-10-CM

## 2018-07-25 DIAGNOSIS — Z34.83 ENCOUNTER FOR SUPERVISION OF OTHER NORMAL PREGNANCY IN THIRD TRIMESTER: Primary | ICD-10-CM

## 2018-07-25 PROCEDURE — 87653 STREP B DNA AMP PROBE: CPT | Performed by: FAMILY MEDICINE

## 2018-07-25 PROCEDURE — 99207 ZZC PRENATAL VISIT: CPT | Performed by: FAMILY MEDICINE

## 2018-07-25 ASSESSMENT — PAIN SCALES - GENERAL: PAINLEVEL: NO PAIN (0)

## 2018-07-25 NOTE — MR AVS SNAPSHOT
After Visit Summary   2018    Eden Yanez    MRN: [id number]           Patient Information     Date Of Birth          1990        Visit Information        Provider Department      2018 11:45 AM Miryam Perez MD [facility]        Today's Diagnoses     Encounter for supervision of other normal pregnancy in third trimester    -  1    Previous  section           Follow-ups after your visit        Your next 10 appointments already scheduled     Aug 03, 2018 10:00 AM CDT   ESTABLISHED PRENATAL with Raheem Batista MD   [facility] ([facility])    [address] [place] [address]   [phone]            Aug 10, 2018 10:15 AM CDT   ESTABLISHED PRENATAL with Miryam Perez MD   [facility] ([facility])    [address] [place] [address]   [phone]            Aug 15, 2018 11:30 AM CDT   ESTABLISHED PRENATAL with Miryam Perez MD   [facility] ([facility])    [address] [place] [address]   [phone]            Aug 19, 2018 11:00 AM CDT   LAB with NL LAB [facility] ([facility])    [address] [place] [address]   [phone]           Please do not eat 10-12 hours before your appointment if you are coming in fasting for labs on lipids, cholesterol, or glucose (sugar). This does not apply to pregnant women. Water, hot tea and black coffee (with nothing added) are okay. Do not drink other fluids, diet soda or chew gum.            Aug 20, 2018   Procedure with Raheem Batista MD   [facility] ([facility])    [address] [place] [address]   [phone]           From [address]: Exit at [address] on south side of [place]. Turn right on [address]. Turn left  "at stoplight on [address]. [facility] will be in view two blocks ahead              Who to contact     If you have questions or need follow up information about today's clinic visit or your schedule please contact [facility] directly at [phone].  Normal or non-critical lab and imaging results will be communicated to you by MyChart, letter or phone within 4 business days after the clinic has received the results. If you do not hear from us within 7 days, please contact the clinic through Whimhart or phone. If you have a critical or abnormal lab result, we will notify you by phone as soon as possible.  Submit refill requests through Ziva Software or call your pharmacy and they will forward the refill request to us. Please allow 3 business days for your refill to be completed.          Additional Information About Your Visit        Whimhart Information     Ziva Software gives you secure access to your electronic health record. If you see a primary care provider, you can also send messages to your care team and make appointments. If you have questions, please call your primary care clinic.  If you do not have a primary care provider, please call [phone] and they will assist you.        Care EveryWhere ID     This is your Care EveryWhere ID. This could be used by other organizations to access your [place] medical records  [id number]        Your Vitals Were     Pulse Temperature Respirations Height Last Period Pulse Oximetry    100 98.2  F (36.8  C) (Temporal) 18 5' 4\" (1.626 m) 11/18/2017 (Approximate) 99%    Breastfeeding? BMI (Body Mass Index)                No 27.98 kg/m2           Blood Pressure from Last 3 Encounters:   07/25/18 98/50   07/20/18 90/52   07/11/18 112/60    Weight from Last 3 Encounters:   07/25/18 163 lb (73.9 kg)   07/20/18 162 lb 12.8 oz (73.8 kg)   07/11/18 161 lb (73 kg)              We Performed the Following     Strep, Group B by PCR        Primary Care Provider " Office Phone # Fax #    Miryam Anay Perez [phone] [address] [place] [address]        Equal Access to Services     YADI PATEL : Hadii aad ku hadrosalvafernando Sherri, waaxda luqadaha, qaybta kaalmada gianni, jocelynn lopez dennysdavide esteves shilpa fernández. So [facility] [phone].    ATENCIÓN: Si habla español, tiene a funez disposición servicios gratuitos de asistencia lingüística. Llame al [phone].    We comply with applicable federal civil rights laws and [place] laws. We do not discriminate on the basis of race, color, national origin, age, disability, sex, sexual orientation, or gender identity.            Thank you!     Thank you for choosing [facility]  for your care. Our goal is always to provide you with excellent care. Hearing back from our patients is one way we can continue to improve our services. Please take a few minutes to complete the written survey that you may receive in the mail after your visit with us. Thank you!             Your Updated Medication List - Protect others around you: Learn how to safely use, store and throw away your medicines at www.disposemymeds.org.          This list is accurate as of 7/25/18 10:20 PM.  Always use your most recent med list.                   Brand Name Dispense Instructions for use Diagnosis    ferrous sulfate 325 (65 Fe) MG tablet    IRON    90 tablet    Take 1 tablet (325 mg) by mouth daily (with breakfast)    Iron deficiency anemia, unspecified iron deficiency anemia type       loratadine 10 MG tablet    CLARITIN     Take 10 mg by mouth daily        OMEGA DHA PO           prenatal multivitamin plus iron 27-0.8 MG Tabs per tablet      Take 1 tablet by mouth daily

## 2018-07-25 NOTE — PROGRESS NOTES
One day of somewhat cloudy urine on Saturday, but other than that urine has been pretty clear.   No complaints. No contractions, vaginal bleeding or fluid leak.  GBS collected today.  Will follow up in one week with Dr. Batista, then preop with me in 2 weeks.   Discussed  labor/onset of labor prior to scheduled surgery date.   Advised to come in sooner with abdominal pain, vaginal bleeding, fluid leak, nausea/ vomiting, headache, vision changes, increased edema or other concerns.  Erika MORALES  Pt was personally seen, interviewed and examined by me, chart notes edited and I agree with assessment as documented above.  Miryam Perez MD

## 2018-07-26 LAB
GP B STREP DNA SPEC QL NAA+PROBE: NEGATIVE
SPECIMEN SOURCE: NORMAL

## 2018-08-03 ENCOUNTER — PRENATAL OFFICE VISIT (OUTPATIENT)
Dept: FAMILY MEDICINE | Facility: CLINIC | Age: 28
End: 2018-08-03

## 2018-08-03 VITALS
TEMPERATURE: 97.5 F | BODY MASS INDEX: 28.49 KG/M2 | SYSTOLIC BLOOD PRESSURE: 90 MMHG | HEART RATE: 74 BPM | RESPIRATION RATE: 16 BRPM | DIASTOLIC BLOOD PRESSURE: 52 MMHG | OXYGEN SATURATION: 97 % | WEIGHT: 166 LBS

## 2018-08-03 DIAGNOSIS — Z34.93 ENCOUNTER FOR PREGNANCY RELATED EXAMINATION IN THIRD TRIMESTER: Primary | ICD-10-CM

## 2018-08-03 PROCEDURE — 99207 ZZC PRENATAL VISIT: CPT | Performed by: OBSTETRICS & GYNECOLOGY

## 2018-08-03 ASSESSMENT — PAIN SCALES - GENERAL: PAINLEVEL: NO PAIN (0)

## 2018-08-03 NOTE — MR AVS SNAPSHOT
After Visit Summary   8/3/2018    Eden Yanez    MRN: [id number]           Patient Information     Date Of Birth          1990        Visit Information        Provider Department      8/3/2018 10:00 AM Raheem Batista MD [facility]        Today's Diagnoses     Encounter for pregnancy related examination in third trimester    -  1       Follow-ups after your visit        Your next 10 appointments already scheduled     Aug 10, 2018 10:15 AM CDT   ESTABLISHED PRENATAL with Miryam Perez MD   [facility]    [address] [place] [address]   [phone]            Aug 15, 2018 11:30 AM CDT   ESTABLISHED PRENATAL with Miryam Perez MD   [facility]    [address] [place] [address]   [phone]            Aug 19, 2018 11:00 AM CDT   LAB with NL LAB [facility] ([facility])    [address] [place] [address]   [phone]           Please do not eat 10-12 hours before your appointment if you are coming in fasting for labs on lipids, cholesterol, or glucose (sugar). This does not apply to pregnant women. Water, hot tea and black coffee (with nothing added) are okay. Do not drink other fluids, diet soda or chew gum.            Aug 20, 2018   Procedure with Raheem Batista MD   [facility] ([facility])    [address] [place] [address]  [place] [address]   [phone]           From [address]: Exit at [address] on south side of [place]. Turn right on [address]. Turn left at stoplight on [address]. [facility] will be in view two blocks ahead              Who to contact     If you have questions or need follow up information about today's clinic visit or your schedule please contact [facility]  directly at [phone].  Normal or non-critical lab and imaging results will be communicated to you by MyChart, letter or phone within 4 business days after the clinic has received the results. If you do not hear from us within 7 days, please contact the clinic through Tradahart or phone. If you have a critical or abnormal lab result, we will notify you by phone as soon as possible.  Submit refill requests through Produce Run or call your pharmacy and they will forward the refill request to us. Please allow 3 business days for your refill to be completed.          Additional Information About Your Visit        Tradahart Information     Pro[employer] Run gives you secure access to your electronic health record. If you see a primary care provider, you can also send messages to your care team and make appointments. If you have questions, please call your primary care clinic.  If you do not have a primary care provider, please call [phone] and they will assist you.        Care EveryWhere ID     This is your Care EveryWhere ID. This could be used by other organizations to access your [place] medical records  [id number]        Your Vitals Were     Pulse Temperature Respirations Last Period Pulse Oximetry Breastfeeding?    74 97.5  F (36.4  C) (Temporal) 16 11/18/2017 (Approximate) 97% No    BMI (Body Mass Index)                   28.49 kg/m2            Blood Pressure from Last 3 Encounters:   08/03/18 90/52   07/25/18 98/50   07/20/18 90/52    Weight from Last 3 Encounters:   08/03/18 166 lb (75.3 kg)   07/25/18 163 lb (73.9 kg)   07/20/18 162 lb 12.8 oz (73.8 kg)              Today, you had the following     No orders found for display       Primary Care Provider Office Phone # Fax #    Miryam Anay Perez [phone]       [address] [place] [address]        Equal Access to Services     JESUS PATEL AH: Chapin Polanco, amrit moreland, dima archer, jocelynn lopez  lorraine lemonsreginoshekhar nails'aaivania ah. So [facility] [phone].    ATENCIÓN: Si habla ervin, tiene a funez disposición servicios gratuitos de asistencia lingüística. Lino al [phone].    We comply with applicable federal civil rights laws and [place] laws. We do not discriminate on the basis of race, color, national origin, age, disability, sex, sexual orientation, or gender identity.            Thank you!     Thank you for choosing [facility]  for your care. Our goal is always to provide you with excellent care. Hearing back from our patients is one way we can continue to improve our services. Please take a few minutes to complete the written survey that you may receive in the mail after your visit with us. Thank you!             Your Updated Medication List - Protect others around you: Learn how to safely use, store and throw away your medicines at www.disposemymeds.org.          This list is accurate as of 8/3/18 10:56 AM.  Always use your most recent med list.                   Brand Name Dispense Instructions for use Diagnosis    ferrous sulfate 325 (65 Fe) MG tablet    IRON    90 tablet    Take 1 tablet (325 mg) by mouth daily (with breakfast)    Iron deficiency anemia, unspecified iron deficiency anemia type       loratadine 10 MG tablet    CLARITIN     Take 10 mg by mouth daily        OMEGA DHA PO           prenatal multivitamin plus iron 27-0.8 MG Tabs per tablet      Take 1 tablet by mouth daily

## 2018-08-03 NOTE — PROGRESS NOTES
She reports daily fetal movement and no concerns.  Prenatal flowsheet info reviewed as listed above.  The fetus is vertex presentation by Leoplold's maneuvers.  Extremities without edema.  We discussed the plans for  section. She read over the consent form and signed it and we talked extensively about the risks. Risks include but are not limited to bleeding, infection, injury to bowel and bladder and other maternal organs as well as risk of injury to the fetus. If bleeding is excessive it might require transfusion or rarely a hysterectomy. She acknowledged understanding of the risks and signed the form. We did discuss the tubal ligation/salpingectomy  as an option at the same time and she does not  want that to be done.She knows to be NPO after midnight and to arrive two hours prior to surgery.She knows to have preop labs drawn ahead of time.   RICARDA Batista MD

## 2018-08-07 ENCOUNTER — TELEPHONE (OUTPATIENT)
Dept: FAMILY MEDICINE | Facility: CLINIC | Age: 28
End: 2018-08-07

## 2018-08-07 NOTE — TELEPHONE ENCOUNTER
Telephone Triage Documentation- [facility]  37 3/7 weeks, HX of previous CS 2 days of having diarrhea.      Contractions: Pt verbalized feeling crampy, having 1 ctn every 35-45 minutes. Nonpainful.        Leaking Fluid: Denies    Bleeding: denies    Headache: denies    : yes 37 3/7 weeks, denies any pelvic pressure backache, or vag dc.    Fetal Movement: yes    Other:  Concerned about when to come to the hospital has a scheduled CS on . Scheduled for her next pnv on 8/10.        Patient Instructed To:  Come to [facility]: If se more than 4-5ctns/hour or intensifying.     Lie on L side, ^ fluids: yes  Fetal Kick Counts: yes      Monitor Ctx: yes  See MD in clinic: on Friday if sx subside.      Call back in a couple hours if sx continue or worsens.   Call 911:  NA  Other: Encouraged to eat more liquids minimal amount of solids this evening and then restart solids in am gradually.        Patient Response  Will come in: if ctns greater than 5/hour, or other s/s of labor.   ETA: NA      Will follow instructions: yes  Will call back in a couple hours prn.      Refuses recommendations:   Other: NA  Updated Dr CLEMENT, she will call pt.

## 2018-08-08 NOTE — TELEPHONE ENCOUNTER
I did speak to her after this and she feels this might be early, early labor.  Feels well enough to not come in, not worried, just wanted us to know. Discussed symptoms to watch for, LOF, bleeding, decreased movement, etc. Otherwise will keep her appt on Friday.  Continue hydrating, follow up sooner prn.   Miryam Perez MD

## 2018-08-10 ENCOUNTER — PRENATAL OFFICE VISIT (OUTPATIENT)
Dept: FAMILY MEDICINE | Facility: CLINIC | Age: 28
End: 2018-08-10

## 2018-08-10 VITALS
DIASTOLIC BLOOD PRESSURE: 64 MMHG | OXYGEN SATURATION: 98 % | WEIGHT: 168 LBS | HEART RATE: 90 BPM | SYSTOLIC BLOOD PRESSURE: 102 MMHG | BODY MASS INDEX: 28.84 KG/M2 | TEMPERATURE: 97 F

## 2018-08-10 DIAGNOSIS — Z98.891 PREVIOUS CESAREAN SECTION: ICD-10-CM

## 2018-08-10 DIAGNOSIS — Z34.83 ENCOUNTER FOR SUPERVISION OF OTHER NORMAL PREGNANCY IN THIRD TRIMESTER: Primary | ICD-10-CM

## 2018-08-10 PROCEDURE — 99207 ZZC PRENATAL VISIT: CPT | Performed by: FAMILY MEDICINE

## 2018-08-10 ASSESSMENT — PAIN SCALES - GENERAL: PAINLEVEL: NO PAIN (0)

## 2018-08-10 NOTE — MR AVS SNAPSHOT
After Visit Summary   8/10/2018    Eden Yanez    MRN: [id number]           Patient Information     Date Of Birth          1990        Visit Information        Provider Department      8/10/2018 10:15 AM Miryam Perez MD [facility]         Follow-ups after your visit        Your next 10 appointments already scheduled     Aug 15, 2018 11:30 AM CDT   ESTABLISHED PRENATAL with Miryam Perez MD   [facility] ([facility]    [address] [place] [id number] [phone]            Aug 19, 2018 11:00 AM CDT   LAB with NL LAB [facility]   [facility] ([facility])    [address] [place] [id number] [phone]           Please do not eat 10-12 hours before your appointment if you are coming in fasting for labs on lipids, cholesterol, or glucose (sugar). This does not apply to pregnant women. Water, hot tea and black coffee (with nothing added) are okay. Do not drink other fluids, diet soda or chew gum.            Aug 20, 2018   Procedure with Raheem Batista MD   [facility] ([facility])    [address] [place] [address]  [place] [address] [phone]           From [address]: Exit at [address] on south side of [place]. Turn right on [address]. Turn left at stoplight on [address] [place] [address]. [facility] will be in view two blocks ahead              Who to contact     If you have questions or need follow up information about today's clinic visit or your schedule please contact [facility] directly at [phone].  Normal or non-critical lab and imaging results will be communicated to you by MyChart, letter or phone within 4 business days after the clinic has received the results. If you do not hear from us within 7 days, please contact the clinic through MyChart or phone. If you have a critical or  abnormal lab result, we will notify you by phone as soon as possible.  Submit refill requests through Ayudarum or call your pharmacy and they will forward the refill request to us. Please allow 3 business days for your refill to be completed.          Additional Information About Your Visit        ROXIMITYhart Information     Ayudarum gives you secure access to your electronic health record. If you see a primary care provider, you can also send messages to your care team and make appointments. If you have questions, please call your primary care clinic.  If you do not have a primary care provider, please call [phone] and they will assist you.        Care EveryWhere ID     This is your Care EveryWhere ID. This could be used by other organizations to access your [place] medical records  [id number]        Your Vitals Were     Pulse Temperature Last Period Pulse Oximetry BMI (Body Mass Index)       90 97  F (36.1  C) (Temporal) 11/18/2017 (Approximate) 98% 28.84 kg/m2        Blood Pressure from Last 3 Encounters:   08/10/18 102/64   08/03/18 90/52   07/25/18 98/50    Weight from Last 3 Encounters:   08/10/18 168 lb (76.2 kg)   08/03/18 166 lb (75.3 kg)   07/25/18 163 lb (73.9 kg)              Today, you had the following     No orders found for display       Primary Care Provider Office Phone # Fax #    Miryam Anay Perez [phone] [address] [place] [address]        Equal Access to Services     YADI PATEL AH: Hadii aad ku hadasho Soomaali, waaxda luqadaha, qaybta kaalmada gianni, jocelynn massey . So [facility] [phone].    ATENCIÓN: Si habla lisaañol, tiene a funez disposición servicios gratuitos de asistencia lingüística. Llame al [phone].    We comply with applicable federal civil rights laws and [place] laws. We do not discriminate on the basis of race, color, national origin, age, disability, sex, sexual orientation, or gender identity.             Thank you!     Thank you for choosing [facility]  for your care. Our goal is always to provide you with excellent care. Hearing back from our patients is one way we can continue to improve our services. Please take a few minutes to complete the written survey that you may receive in the mail after your visit with us. Thank you!             Your Updated Medication List - Protect others around you: Learn how to safely use, store and throw away your medicines at www.disposemymeds.org.          This list is accurate as of 8/10/18 11:30 AM.  Always use your most recent med list.                   Brand Name Dispense Instructions for use Diagnosis    ferrous sulfate 325 (65 Fe) MG tablet    IRON    90 tablet    Take 1 tablet (325 mg) by mouth daily (with breakfast)    Iron deficiency anemia, unspecified iron deficiency anemia type       loratadine 10 MG tablet    CLARITIN     Take 10 mg by mouth daily        OMEGA DHA PO           prenatal multivitamin plus iron 27-0.8 MG Tabs per tablet      Take 1 tablet by mouth daily

## 2018-08-10 NOTE — PROGRESS NOTES
Having some ctx, not as regular but stronger.    No bleeding.   Having some loose stools. Staying well hydrated.   Met with Dr. Batista to review plans for .   Discussed when to present for signs and symptoms of labor if it occurs prior to scheduled surgery.   Discussed warning signs for preeclampsia.  Will f/u in 1 week(s) or sooner with any concern for decreased fetal movement, vaginal bleeding, fluid leak, headache, vision change, severe nausea or vomiting, abdominal pain, increased edema or other concerns.   Miryam Perez MD

## 2018-08-15 ENCOUNTER — PRENATAL OFFICE VISIT (OUTPATIENT)
Dept: FAMILY MEDICINE | Facility: CLINIC | Age: 28
End: 2018-08-15

## 2018-08-15 ENCOUNTER — OFFICE VISIT (OUTPATIENT)
Dept: FAMILY MEDICINE | Facility: CLINIC | Age: 28
End: 2018-08-15

## 2018-08-15 VITALS
OXYGEN SATURATION: 98 % | DIASTOLIC BLOOD PRESSURE: 52 MMHG | SYSTOLIC BLOOD PRESSURE: 88 MMHG | TEMPERATURE: 97.2 F | BODY MASS INDEX: 28.7 KG/M2 | HEART RATE: 115 BPM | WEIGHT: 167.2 LBS

## 2018-08-15 VITALS
BODY MASS INDEX: 28.7 KG/M2 | WEIGHT: 167.2 LBS | OXYGEN SATURATION: 98 % | HEART RATE: 115 BPM | SYSTOLIC BLOOD PRESSURE: 88 MMHG | DIASTOLIC BLOOD PRESSURE: 52 MMHG | TEMPERATURE: 97.2 F

## 2018-08-15 DIAGNOSIS — Z82.49 FAMILY HISTORY OF DVT: ICD-10-CM

## 2018-08-15 DIAGNOSIS — Z34.83 ENCOUNTER FOR SUPERVISION OF OTHER NORMAL PREGNANCY IN THIRD TRIMESTER: Primary | ICD-10-CM

## 2018-08-15 DIAGNOSIS — Z98.891 PREVIOUS CESAREAN SECTION: ICD-10-CM

## 2018-08-15 DIAGNOSIS — Z01.818 PREOP GENERAL PHYSICAL EXAM: ICD-10-CM

## 2018-08-15 PROCEDURE — 99207 ZZC PRENATAL VISIT: CPT | Performed by: FAMILY MEDICINE

## 2018-08-15 PROCEDURE — 99207 ZZC COMPLICATED OB VISIT: CPT | Performed by: FAMILY MEDICINE

## 2018-08-15 ASSESSMENT — PAIN SCALES - GENERAL: PAINLEVEL: NO PAIN (0)

## 2018-08-15 NOTE — PROGRESS NOTES
[address] [address]  [phone]  Dept: [phone]    PRE-OP EVALUATION:  Today's date: 8/15/2018    Eden Yanez (: 1990) presents for pre-operative evaluation assessment as requested by Dr. Raheem Batista.  She requires evaluation and anesthesia risk assessment prior to undergoing surgery/procedure for treatment of pregnancy with prior history of  section.    Fax number for surgical facility: NA  Primary Physician: Miryam Perez  Type of Anesthesia Anticipated: Spinal    Patient has a Health Care Directive or Living Will:  NO    Preop Questions 8/15/2018   Who is doing your surgery? dr batista   What are you having done? c section   Date of Surgery/Procedure: 2018   Facility or Hospital where procedure/surgery will be performed: [facility]   1.  Do you have a history of Heart attack, stroke, stent, coronary bypass surgery, or other heart surgery? No   2.  Do you ever have any pain or discomfort in your chest? No   3.  Do you have a history of  Heart Failure? No   4.   Are you troubled by shortness of breath when:  walking on a level surface, or up a slight hill, or at night? No   5.  Do you currently have a cold, bronchitis or other respiratory infection? No   6.  Do you have a cough, shortness of breath, or wheezing? No   7.  Do you sometimes get pains in the calves of your legs when you walk? No   8. Do you or anyone in your family have previous history of blood clots? YES - sister, mom and maternal grandpa (DVT)   9.  Do you or does anyone in your family have a serious bleeding problem such as prolonged bleeding following surgeries or cuts? No   10. Have you ever had problems with anemia or been told to take iron pills? YES - iron pill during pregnancy   11. Have you had any abnormal blood loss such as black, tarry or bloody stools, or abnormal vaginal bleeding? No   12. Have you ever had a blood  transfusion? No   13. Have you or any of your relatives ever had problems with anesthesia? No   14. Do you have sleep apnea, excessive snoring or daytime drowsiness? No   15. Do you have any prosthetic heart valves? No   16. Do you have prosthetic joints? No   17. Is there any chance that you may be pregnant? YES - currently         HPI:     HPI related to upcoming procedure: Patient presents for preop for elective  18.  She is currently 38 4/7 weeks pregnant with her 3rd child. She has had c-sections for both previous pregnancies.       MEDICAL HISTORY:     Patient Active Problem List    Diagnosis Date Noted     Encounter for supervision of other normal pregnancy in third trimester 2018     Priority: Medium     Previous  section 2018     Priority: Medium     Vomiting 2016     Priority: Medium      Past Medical History:   Diagnosis Date     NO ACTIVE PROBLEMS      Past Surgical History:   Procedure Laterality Date      SECTION N/A 2014    Procedure:  SECTION;  Surgeon: Raheem Batista MD;  Location: [facility]      SECTION N/A 2016    Procedure:  SECTION;  Surgeon: Raheem Batista MD;  Location: [facility]     Current Outpatient Prescriptions   Medication Sig Dispense Refill     ferrous sulfate (IRON) 325 (65 Fe) MG tablet Take 1 tablet (325 mg) by mouth daily (with breakfast) 90 tablet 1     loratadine (CLARITIN) 10 MG tablet Take 10 mg by mouth daily       Omega 3-6-9 Fatty Acids (OMEGA DHA PO)        Prenatal Vit-Fe Fumarate-FA (PRENATAL MULTIVITAMIN PLUS IRON) 27-0.8 MG TABS per tablet Take 1 tablet by mouth daily       OTC products: None, except as noted above    Allergies   Allergen Reactions     Cephalexin Rash     Cephalosporins Rash     This occurred on the third day of using cephalexin. Was not observed by MD but reported as a rash on abdomen and legs.      Latex Allergy: NO    Social History   Substance Use  Topics     Smoking status: Never Smoker     Smokeless tobacco: Never Used     Alcohol use No     History   Drug Use No       REVIEW OF SYSTEMS:   CONSTITUTIONAL: NEGATIVE for fever, chills.  Pregnancy related weight change.  INTEGUMENTARY/SKIN: NEGATIVE for worrisome rashes, moles or lesions  EYES: NEGATIVE for vision changes or irritation  ENT/MOUTH: NEGATIVE for ear, mouth and throat problems  RESP: NEGATIVE for significant cough or SOB  CV: NEGATIVE for chest pain, palpitations or peripheral edema  GI: NEGATIVE for nausea, abdominal pain, heartburn, or change in bowel habits  : NEGATIVE for frequency, dysuria, or hematuria  MUSCULOSKELETAL: NEGATIVE for significant arthralgias or myalgia  NEURO: NEGATIVE for weakness, dizziness or paresthesias  HEME: NEGATIVE for bleeding problems, although she has significant family history for DVTs- see above.  PSYCHIATRIC: NEGATIVE for changes in mood or affect    EXAM:   BP (!) 88/52  Pulse 115  Temp 97.2  F (36.2  C) (Temporal)  Wt 167 lb 3.2 oz (75.8 kg)  LMP 11/18/2017 (Approximate)  SpO2 98%  BMI 28.7 kg/m2    GENERAL APPEARANCE: healthy, alert and no distress     RESP: lungs clear to auscultation - no rales, rhonchi or wheezes     CV: regular rates and rhythm, normal S1 S2. no murmur, click or rub.  No edema     ABDOMEN:  Soft, nontender, nondistended with good bowel sounds, gravid without other masses or hepatosplenomegaly.  Baby cephalic with normal heart tones- see flow sheet.      MS: extremities grossly normal     SKIN: no suspicious lesions or rashes     NEURO: sensory exam grossly normal, mentation intact and speech normal     PSYCH: mentation appears normal. and affect normal/bright    DIAGNOSTICS:   EKG: Not indicated due to non-vascular surgery and low risk of event (age <65 and without cardiac risk factors)  Will do pre-op labs on admission    IMPRESSION:   Reason for surgery/procedure:     Encounter for supervision of other normal pregnancy in third  trimester  Previous  section    Diagnosis/reason for consult:      Encounter for supervision of other normal pregnancy in third trimester  Preop general physical exam  Previous  section  Family history of DVT     The proposed surgical procedure is considered INTERMEDIATE risk.    REVISED CARDIAC RISK INDEX  The patient has the following serious cardiovascular risks for perioperative complications such as (MI, PE, VFib and 3  AV Block):  No serious cardiac risks  INTERPRETATION: 0 risks: Class I (very low risk - 0.4% complication rate)    The patient has the following additional risks for perioperative complications:  No identified additional risks      RECOMMENDATIONS:     She is on no scheduled medications except for prenatal vitamins and iron. Will NOT need to take those on the morning of surgery.   Will resume after surgery.     APPROVAL GIVEN to proceed with proposed procedure, without further diagnostic evaluation       Signed Electronically by: Miryam Perez MD    Copy of this evaluation report is provided to requesting physician.    [facility] Preop Guidelines    Revised Cardiac Risk Index

## 2018-08-15 NOTE — PROGRESS NOTES
Doing well overall.  No bleeding, no regular ctx.   She is also having preop done today, see separate note. Plan for  in 5 days.   Discussed labor symptoms to watch for prior, knows to come in early if labor starts or ROM.   Discussed warning signs for preeclampsia.  Will f/u in 5 days or sooner with any concern for decreased fetal movement, vaginal bleeding, fluid leak, headache, vision change, severe nausea or vomiting, abdominal pain, increased edema or other concerns.   Miryam Perez MD

## 2018-08-15 NOTE — MR AVS SNAPSHOT
After Visit Summary   8/15/2018    Eden Yanez    MRN: [id number]           Patient Information     Date Of Birth          1990        Visit Information        Provider Department      8/15/2018 11:30 AM Miryam Perez MD [facility]         Follow-ups after your visit        Your next 10 appointments already scheduled     Aug 19, 2018 11:00 AM CDT   LAB with [facility]   [facility]    [address] [address]   [phone]           Please do not eat 10-12 hours before your appointment if you are coming in fasting for labs on lipids, cholesterol, or glucose (sugar). This does not apply to pregnant women. Water, hot tea and black coffee (with nothing added) are okay. Do not drink other fluids, diet soda or chew gum.            Aug 20, 2018   Procedure with Raheem Batista MD   [facility] ([facility])    [address] [place] [address]   [phone]           From [address]: Exit at [address] on south side of [place]. Turn right on [address]. Turn left at stoplight on [address]. [facility] will be in view two blocks ahead              Who to contact     If you have questions or need follow up information about today's clinic visit or your schedule please contact [facility] directly at [phone].  Normal or non-critical lab and imaging results will be communicated to you by MyChart, letter or phone within 4 business days after the clinic has received the results. If you do not hear from us within 7 days, please contact the clinic through MyChart or phone. If you have a critical or abnormal lab result, we will notify you by phone as soon as possible.  Submit refill requests through "[employer] or call your pharmacy and they will forward the refill request to us. Please allow 3 business days for your  refill to be completed.          Additional Information About Your Visit        MyChart Information     [employer] gives you secure access to your electronic health record. If you see a primary care provider, you can also send messages to your care team and make appointments. If you have questions, please call your primary care clinic.  If you do not have a primary care provider, please call [phone] and they will assist you.        Care EveryWhere ID     This is your Care EveryWhere ID. This could be used by other organizations to access your [place] medical records  [id number]        Your Vitals Were     Pulse Temperature Last Period Pulse Oximetry BMI (Body Mass Index)       115 97.2  F (36.2  C) (Temporal) 11/18/2017 (Approximate) 98% 28.7 kg/m2        Blood Pressure from Last 3 Encounters:   08/15/18 (!) 88/52   08/15/18 (!) 88/52   08/10/18 102/64    Weight from Last 3 Encounters:   08/15/18 167 lb 3.2 oz (75.8 kg)   08/15/18 167 lb 3.2 oz (75.8 kg)   08/10/18 168 lb (76.2 kg)              Today, you had the following     No orders found for display       Primary Care Provider Office Phone # Fax #    Miryam Anay Perez [phone] [address] [place] [address]        Equal Access to Services     YADI PATEL : Hadii aad ku hadasho Soomaali, waaxda luqadaha, qaybta kaalmada adeegyada, jocelynn fernández. So [facility] [phone].    ATENCIÓN: Si habla español, tiene a funez disposición servicios gratuitos de asistencia lingüística. Llame al [phone].    We comply with applicable federal civil rights laws and [place] laws. We do not discriminate on the basis of race, color, national origin, age, disability, sex, sexual orientation, or gender identity.            Thank you!     Thank you for choosing [facility]  for your care. Our goal is always to provide you with excellent care. Hearing back from our patients is one way we can  continue to improve our services. Please take a few minutes to complete the written survey that you may receive in the mail after your visit with us. Thank you!             Your Updated Medication List - Protect others around you: Learn how to safely use, store and throw away your medicines at www.disposemymeds.org.          This list is accurate as of 8/15/18 12:36 PM.  Always use your most recent med list.                   Brand Name Dispense Instructions for use Diagnosis    ferrous sulfate 325 (65 Fe) MG tablet    IRON    90 tablet    Take 1 tablet (325 mg) by mouth daily (with breakfast)    Iron deficiency anemia, unspecified iron deficiency anemia type       loratadine 10 MG tablet    CLARITIN     Take 10 mg by mouth daily        OMEGA DHA PO           prenatal multivitamin plus iron 27-0.8 MG Tabs per tablet      Take 1 tablet by mouth daily

## 2018-08-17 RX ORDER — CITRIC ACID/SODIUM CITRATE 334-500MG
30 SOLUTION, ORAL ORAL
Status: CANCELLED | OUTPATIENT
Start: 2018-08-17

## 2018-08-17 RX ORDER — SODIUM CHLORIDE, SODIUM LACTATE, POTASSIUM CHLORIDE, CALCIUM CHLORIDE 600; 310; 30; 20 MG/100ML; MG/100ML; MG/100ML; MG/100ML
INJECTION, SOLUTION INTRAVENOUS CONTINUOUS
Status: CANCELLED | OUTPATIENT
Start: 2018-08-17

## 2018-08-17 RX ORDER — CLINDAMYCIN PHOSPHATE 900 MG/50ML
900 INJECTION, SOLUTION INTRAVENOUS
Status: CANCELLED | OUTPATIENT
Start: 2018-08-17

## 2018-08-19 DIAGNOSIS — Z01.812 PRE-PROCEDURE LAB EXAM: ICD-10-CM

## 2018-08-19 LAB
ABO + RH BLD: NORMAL
ABO + RH BLD: NORMAL
BASOPHILS # BLD AUTO: 0 10E9/L (ref 0–0.2)
BASOPHILS NFR BLD AUTO: 0.5 %
BLD GP AB SCN SERPL QL: NORMAL
BLOOD BANK CMNT PATIENT-IMP: NORMAL
CREAT SERPL-MCNC: 0.66 MG/DL (ref 0.52–1.04)
DIFFERENTIAL METHOD BLD: ABNORMAL
EOSINOPHIL NFR BLD AUTO: 1.2 %
ERYTHROCYTE [DISTWIDTH] IN BLOOD BY AUTOMATED COUNT: 14 % (ref 10–15)
GFR SERPL CREATININE-BSD FRML MDRD: >90 ML/MIN/1.7M2
HCT VFR BLD AUTO: 35 % (ref 35–47)
HGB BLD-MCNC: 11.6 G/DL (ref 11.7–15.7)
IMM GRANULOCYTES # BLD: 0 10E9/L (ref 0–0.4)
IMM GRANULOCYTES NFR BLD: 0.5 %
LYMPHOCYTES # BLD AUTO: 1.2 10E9/L (ref 0.8–5.3)
LYMPHOCYTES NFR BLD AUTO: 17.6 %
MCH RBC QN AUTO: 30.1 PG (ref 26.5–33)
MCHC RBC AUTO-ENTMCNC: 33.1 G/DL (ref 31.5–36.5)
MCV RBC AUTO: 91 FL (ref 78–100)
MONOCYTES # BLD AUTO: 0.4 10E9/L (ref 0–1.3)
MONOCYTES NFR BLD AUTO: 5.6 %
NEUTROPHILS # BLD AUTO: 5 10E9/L (ref 1.6–8.3)
NEUTROPHILS NFR BLD AUTO: 74.6 %
NRBC # BLD AUTO: 0 10*3/UL
NRBC BLD AUTO-RTO: 0 /100
PLATELET # BLD AUTO: 131 10E9/L (ref 150–450)
RBC # BLD AUTO: 3.85 10E12/L (ref 3.8–5.2)
SPECIMEN EXP DATE BLD: NORMAL
WBC # BLD AUTO: 6.7 10E9/L (ref 4–11)

## 2018-08-19 PROCEDURE — 86850 RBC ANTIBODY SCREEN: CPT | Performed by: OBSTETRICS & GYNECOLOGY

## 2018-08-19 PROCEDURE — 86900 BLOOD TYPING SEROLOGIC ABO: CPT | Performed by: OBSTETRICS & GYNECOLOGY

## 2018-08-19 PROCEDURE — 82565 ASSAY OF CREATININE: CPT | Performed by: OBSTETRICS & GYNECOLOGY

## 2018-08-19 PROCEDURE — 36415 COLL VENOUS BLD VENIPUNCTURE: CPT | Performed by: OBSTETRICS & GYNECOLOGY

## 2018-08-19 PROCEDURE — 86901 BLOOD TYPING SEROLOGIC RH(D): CPT | Performed by: OBSTETRICS & GYNECOLOGY

## 2018-08-19 PROCEDURE — 86780 TREPONEMA PALLIDUM: CPT | Performed by: OBSTETRICS & GYNECOLOGY

## 2018-08-19 PROCEDURE — 85025 COMPLETE CBC W/AUTO DIFF WBC: CPT | Performed by: OBSTETRICS & GYNECOLOGY

## 2018-08-20 ENCOUNTER — HOSPITAL ENCOUNTER (INPATIENT)
Facility: CLINIC | Age: 28
LOS: 2 days | Discharge: HOME | End: 2018-08-22
Attending: OBSTETRICS & GYNECOLOGY | Admitting: OBSTETRICS & GYNECOLOGY

## 2018-08-20 ENCOUNTER — ANESTHESIA EVENT (OUTPATIENT)
Dept: OBGYN | Facility: CLINIC | Age: 28
End: 2018-08-20

## 2018-08-20 ENCOUNTER — ANESTHESIA (OUTPATIENT)
Dept: OBGYN | Facility: CLINIC | Age: 28
End: 2018-08-20

## 2018-08-20 DIAGNOSIS — Z98.891 S/P CESAREAN SECTION: Primary | ICD-10-CM

## 2018-08-20 LAB
BLOOD BANK CMNT PATIENT-IMP: NORMAL
BLOOD BANK CMNT PATIENT-IMP: NORMAL
T PALLIDUM AB SER QL: NONREACTIVE

## 2018-08-20 PROCEDURE — 25000125 ZZHC RX 250: Performed by: OBSTETRICS & GYNECOLOGY

## 2018-08-20 PROCEDURE — 37000009 ZZH ANESTHESIA TECHNICAL FEE, EACH ADDTL 15 MIN: Performed by: OBSTETRICS & GYNECOLOGY

## 2018-08-20 PROCEDURE — 37000008 ZZH ANESTHESIA TECHNICAL FEE, 1ST 30 MIN: Performed by: OBSTETRICS & GYNECOLOGY

## 2018-08-20 PROCEDURE — 59510 CESAREAN DELIVERY: CPT | Performed by: OBSTETRICS & GYNECOLOGY

## 2018-08-20 PROCEDURE — 59514 CESAREAN DELIVERY ONLY: CPT | Mod: 80 | Performed by: FAMILY MEDICINE

## 2018-08-20 PROCEDURE — 25000128 H RX IP 250 OP 636: Performed by: OBSTETRICS & GYNECOLOGY

## 2018-08-20 PROCEDURE — 71000014 ZZH RECOVERY PHASE 1 LEVEL 2 FIRST HR: Performed by: OBSTETRICS & GYNECOLOGY

## 2018-08-20 PROCEDURE — 25000132 ZZH RX MED GY IP 250 OP 250 PS 637: Performed by: OBSTETRICS & GYNECOLOGY

## 2018-08-20 PROCEDURE — 27110028 ZZH OR GENERAL SUPPLY NON-STERILE: Performed by: OBSTETRICS & GYNECOLOGY

## 2018-08-20 PROCEDURE — 25000125 ZZHC RX 250: Performed by: NURSE ANESTHETIST, CERTIFIED REGISTERED

## 2018-08-20 PROCEDURE — 36000056 ZZH SURGERY LEVEL 3 1ST 30 MIN: Performed by: OBSTETRICS & GYNECOLOGY

## 2018-08-20 PROCEDURE — 36000058 ZZH SURGERY LEVEL 3 EA 15 ADDTL MIN: Performed by: OBSTETRICS & GYNECOLOGY

## 2018-08-20 PROCEDURE — 25000125 ZZHC RX 250

## 2018-08-20 PROCEDURE — 27210794 ZZH OR GENERAL SUPPLY STERILE: Performed by: OBSTETRICS & GYNECOLOGY

## 2018-08-20 PROCEDURE — 25000128 H RX IP 250 OP 636: Performed by: NURSE ANESTHETIST, CERTIFIED REGISTERED

## 2018-08-20 PROCEDURE — 12000031 ZZH R&B OB CRITICAL

## 2018-08-20 RX ORDER — ONDANSETRON 2 MG/ML
4 INJECTION INTRAMUSCULAR; INTRAVENOUS EVERY 6 HOURS PRN
Status: DISCONTINUED | OUTPATIENT
Start: 2018-08-20 | End: 2018-08-22 | Stop reason: HOSPADM

## 2018-08-20 RX ORDER — SIMETHICONE 80 MG
80 TABLET,CHEWABLE ORAL 4 TIMES DAILY PRN
Status: DISCONTINUED | OUTPATIENT
Start: 2018-08-20 | End: 2018-08-22 | Stop reason: HOSPADM

## 2018-08-20 RX ORDER — ONDANSETRON 2 MG/ML
4 INJECTION INTRAMUSCULAR; INTRAVENOUS EVERY 6 HOURS PRN
Status: DISCONTINUED | OUTPATIENT
Start: 2018-08-20 | End: 2018-08-20

## 2018-08-20 RX ORDER — OXYTOCIN 10 [USP'U]/ML
INJECTION, SOLUTION INTRAMUSCULAR; INTRAVENOUS PRN
Status: DISCONTINUED | OUTPATIENT
Start: 2018-08-20 | End: 2018-08-20

## 2018-08-20 RX ORDER — ONDANSETRON 4 MG/1
4 TABLET, ORALLY DISINTEGRATING ORAL EVERY 6 HOURS PRN
Status: DISCONTINUED | OUTPATIENT
Start: 2018-08-20 | End: 2018-08-20

## 2018-08-20 RX ORDER — DIPHENHYDRAMINE HCL 25 MG
25 CAPSULE ORAL EVERY 6 HOURS PRN
Status: DISCONTINUED | OUTPATIENT
Start: 2018-08-20 | End: 2018-08-22 | Stop reason: HOSPADM

## 2018-08-20 RX ORDER — ACETAMINOPHEN 500 MG
1000 TABLET ORAL EVERY 4 HOURS PRN
Status: DISCONTINUED | OUTPATIENT
Start: 2018-08-20 | End: 2018-08-21

## 2018-08-20 RX ORDER — DIPHENHYDRAMINE HYDROCHLORIDE 50 MG/ML
25 INJECTION INTRAMUSCULAR; INTRAVENOUS EVERY 6 HOURS PRN
Status: DISCONTINUED | OUTPATIENT
Start: 2018-08-20 | End: 2018-08-22 | Stop reason: HOSPADM

## 2018-08-20 RX ORDER — SCOLOPAMINE TRANSDERMAL SYSTEM 1 MG/1
1 PATCH, EXTENDED RELEASE TRANSDERMAL ONCE
Status: DISCONTINUED | OUTPATIENT
Start: 2018-08-20 | End: 2018-08-20

## 2018-08-20 RX ORDER — IBUPROFEN 600 MG/1
600 TABLET, FILM COATED ORAL EVERY 6 HOURS PRN
Status: DISCONTINUED | OUTPATIENT
Start: 2018-08-20 | End: 2018-08-22 | Stop reason: HOSPADM

## 2018-08-20 RX ORDER — NALOXONE HYDROCHLORIDE 0.4 MG/ML
.1-.4 INJECTION, SOLUTION INTRAMUSCULAR; INTRAVENOUS; SUBCUTANEOUS
Status: DISCONTINUED | OUTPATIENT
Start: 2018-08-20 | End: 2018-08-22 | Stop reason: HOSPADM

## 2018-08-20 RX ORDER — SODIUM CHLORIDE, SODIUM LACTATE, POTASSIUM CHLORIDE, CALCIUM CHLORIDE 600; 310; 30; 20 MG/100ML; MG/100ML; MG/100ML; MG/100ML
INJECTION, SOLUTION INTRAVENOUS CONTINUOUS
Status: DISCONTINUED | OUTPATIENT
Start: 2018-08-20 | End: 2018-08-20

## 2018-08-20 RX ORDER — PRENATAL VIT/IRON FUM/FOLIC AC 27MG-0.8MG
1 TABLET ORAL DAILY
Status: DISCONTINUED | OUTPATIENT
Start: 2018-08-20 | End: 2018-08-22 | Stop reason: HOSPADM

## 2018-08-20 RX ORDER — NALOXONE HYDROCHLORIDE 0.4 MG/ML
.1-.4 INJECTION, SOLUTION INTRAMUSCULAR; INTRAVENOUS; SUBCUTANEOUS
Status: DISCONTINUED | OUTPATIENT
Start: 2018-08-20 | End: 2018-08-20

## 2018-08-20 RX ORDER — NALBUPHINE HYDROCHLORIDE 10 MG/ML
2.5-5 INJECTION, SOLUTION INTRAMUSCULAR; INTRAVENOUS; SUBCUTANEOUS EVERY 6 HOURS PRN
Status: DISCONTINUED | OUTPATIENT
Start: 2018-08-20 | End: 2018-08-20

## 2018-08-20 RX ORDER — BUPIVACAINE HYDROCHLORIDE 7.5 MG/ML
INJECTION, SOLUTION INTRASPINAL PRN
Status: DISCONTINUED | OUTPATIENT
Start: 2018-08-20 | End: 2018-08-20

## 2018-08-20 RX ORDER — BUPIVACAINE HYDROCHLORIDE 7.5 MG/ML
INJECTION, SOLUTION EPIDURAL; RETROBULBAR PRN
Status: DISCONTINUED | OUTPATIENT
Start: 2018-08-20 | End: 2018-08-20

## 2018-08-20 RX ORDER — BUPIVACAINE HYDROCHLORIDE AND EPINEPHRINE 5; 5 MG/ML; UG/ML
INJECTION, SOLUTION PERINEURAL PRN
Status: DISCONTINUED | OUTPATIENT
Start: 2018-08-20 | End: 2018-08-20

## 2018-08-20 RX ORDER — BISACODYL 10 MG
10 SUPPOSITORY, RECTAL RECTAL DAILY PRN
Status: DISCONTINUED | OUTPATIENT
Start: 2018-08-22 | End: 2018-08-22 | Stop reason: HOSPADM

## 2018-08-20 RX ORDER — CLINDAMYCIN PHOSPHATE 900 MG/50ML
900 INJECTION, SOLUTION INTRAVENOUS
Status: COMPLETED | OUTPATIENT
Start: 2018-08-20 | End: 2018-08-20

## 2018-08-20 RX ORDER — ONDANSETRON 2 MG/ML
INJECTION INTRAMUSCULAR; INTRAVENOUS PRN
Status: DISCONTINUED | OUTPATIENT
Start: 2018-08-20 | End: 2018-08-20

## 2018-08-20 RX ORDER — LIDOCAINE 40 MG/G
CREAM TOPICAL
Status: DISCONTINUED | OUTPATIENT
Start: 2018-08-20 | End: 2018-08-20

## 2018-08-20 RX ORDER — OXYTOCIN/0.9 % SODIUM CHLORIDE 30/500 ML
340 PLASTIC BAG, INJECTION (ML) INTRAVENOUS CONTINUOUS PRN
Status: DISCONTINUED | OUTPATIENT
Start: 2018-08-20 | End: 2018-08-21 | Stop reason: CLARIF

## 2018-08-20 RX ORDER — LIDOCAINE 40 MG/G
CREAM TOPICAL
Status: DISCONTINUED | OUTPATIENT
Start: 2018-08-20 | End: 2018-08-22 | Stop reason: HOSPADM

## 2018-08-20 RX ORDER — OXYCODONE HYDROCHLORIDE 5 MG/1
5 TABLET ORAL
Status: DISCONTINUED | OUTPATIENT
Start: 2018-08-20 | End: 2018-08-20

## 2018-08-20 RX ORDER — AMOXICILLIN 250 MG
2 CAPSULE ORAL 2 TIMES DAILY PRN
Status: DISCONTINUED | OUTPATIENT
Start: 2018-08-20 | End: 2018-08-22 | Stop reason: HOSPADM

## 2018-08-20 RX ORDER — FENTANYL CITRATE 50 UG/ML
INJECTION, SOLUTION INTRAMUSCULAR; INTRAVENOUS PRN
Status: DISCONTINUED | OUTPATIENT
Start: 2018-08-20 | End: 2018-08-20

## 2018-08-20 RX ORDER — MISOPROSTOL 200 UG/1
400 TABLET ORAL
Status: DISCONTINUED | OUTPATIENT
Start: 2018-08-20 | End: 2018-08-21 | Stop reason: CLARIF

## 2018-08-20 RX ORDER — CITRIC ACID/SODIUM CITRATE 334-500MG
30 SOLUTION, ORAL ORAL
Status: DISCONTINUED | OUTPATIENT
Start: 2018-08-20 | End: 2018-08-20

## 2018-08-20 RX ORDER — FERROUS SULFATE 325(65) MG
325 TABLET ORAL
Status: DISCONTINUED | OUTPATIENT
Start: 2018-08-20 | End: 2018-08-22 | Stop reason: HOSPADM

## 2018-08-20 RX ORDER — OXYTOCIN 10 [USP'U]/ML
10 INJECTION, SOLUTION INTRAMUSCULAR; INTRAVENOUS
Status: DISCONTINUED | OUTPATIENT
Start: 2018-08-20 | End: 2018-08-21 | Stop reason: CLARIF

## 2018-08-20 RX ORDER — OXYTOCIN/0.9 % SODIUM CHLORIDE 30/500 ML
PLASTIC BAG, INJECTION (ML) INTRAVENOUS PRN
Status: DISCONTINUED | OUTPATIENT
Start: 2018-08-20 | End: 2018-08-20

## 2018-08-20 RX ORDER — OXYTOCIN/0.9 % SODIUM CHLORIDE 30/500 ML
100 PLASTIC BAG, INJECTION (ML) INTRAVENOUS CONTINUOUS
Status: DISCONTINUED | OUTPATIENT
Start: 2018-08-20 | End: 2018-08-22 | Stop reason: HOSPADM

## 2018-08-20 RX ORDER — OXYTOCIN 10 [USP'U]/ML
INJECTION, SOLUTION INTRAMUSCULAR; INTRAVENOUS
Status: DISCONTINUED
Start: 2018-08-20 | End: 2018-08-20

## 2018-08-20 RX ORDER — OXYTOCIN/0.9 % SODIUM CHLORIDE 30/500 ML
PLASTIC BAG, INJECTION (ML) INTRAVENOUS
Status: COMPLETED
Start: 2018-08-20 | End: 2018-08-20

## 2018-08-20 RX ORDER — LANOLIN 100 %
OINTMENT (GRAM) TOPICAL
Status: DISCONTINUED | OUTPATIENT
Start: 2018-08-20 | End: 2018-08-22 | Stop reason: HOSPADM

## 2018-08-20 RX ORDER — HYDROCORTISONE 2.5 %
CREAM (GRAM) TOPICAL 3 TIMES DAILY PRN
Status: DISCONTINUED | OUTPATIENT
Start: 2018-08-20 | End: 2018-08-22 | Stop reason: HOSPADM

## 2018-08-20 RX ORDER — DEXTROSE, SODIUM CHLORIDE, SODIUM LACTATE, POTASSIUM CHLORIDE, AND CALCIUM CHLORIDE 5; .6; .31; .03; .02 G/100ML; G/100ML; G/100ML; G/100ML; G/100ML
INJECTION, SOLUTION INTRAVENOUS CONTINUOUS
Status: DISCONTINUED | OUTPATIENT
Start: 2018-08-20 | End: 2018-08-22 | Stop reason: HOSPADM

## 2018-08-20 RX ORDER — OXYCODONE HYDROCHLORIDE 5 MG/1
5-10 TABLET ORAL
Status: DISCONTINUED | OUTPATIENT
Start: 2018-08-20 | End: 2018-08-22 | Stop reason: HOSPADM

## 2018-08-20 RX ORDER — METHYLERGONOVINE MALEATE 0.2 MG/ML
INJECTION INTRAVENOUS PRN
Status: DISCONTINUED | OUTPATIENT
Start: 2018-08-20 | End: 2018-08-20

## 2018-08-20 RX ORDER — AMOXICILLIN 250 MG
1 CAPSULE ORAL 2 TIMES DAILY PRN
Status: DISCONTINUED | OUTPATIENT
Start: 2018-08-20 | End: 2018-08-22 | Stop reason: HOSPADM

## 2018-08-20 RX ORDER — EPHEDRINE SULFATE 50 MG/ML
5 INJECTION, SOLUTION INTRAMUSCULAR; INTRAVENOUS; SUBCUTANEOUS
Status: DISCONTINUED | OUTPATIENT
Start: 2018-08-20 | End: 2018-08-20

## 2018-08-20 RX ORDER — CLINDAMYCIN PHOSPHATE 900 MG/50ML
900 INJECTION, SOLUTION INTRAVENOUS
Status: DISCONTINUED | OUTPATIENT
Start: 2018-08-20 | End: 2018-08-20

## 2018-08-20 RX ADMIN — BUPIVACAINE HYDROCHLORIDE AND EPINEPHRINE BITARTRATE 15 ML: 5; .005 INJECTION, SOLUTION PERINEURAL at 08:56

## 2018-08-20 RX ADMIN — Medication 100 ML/HR: at 10:48

## 2018-08-20 RX ADMIN — SODIUM CHLORIDE, SODIUM LACTATE, POTASSIUM CHLORIDE, CALCIUM CHLORIDE AND DEXTROSE MONOHYDRATE: 5; 600; 310; 30; 20 INJECTION, SOLUTION INTRAVENOUS at 15:58

## 2018-08-20 RX ADMIN — CLINDAMYCIN PHOSPHATE 900 MG: 900 INJECTION, SOLUTION INTRAVENOUS at 07:47

## 2018-08-20 RX ADMIN — BUPIVACAINE HYDROCHLORIDE 2 ML: 7.5 INJECTION, SOLUTION EPIDURAL; RETROBULBAR at 07:42

## 2018-08-20 RX ADMIN — OXYCODONE HYDROCHLORIDE 5 MG: 5 TABLET ORAL at 13:35

## 2018-08-20 RX ADMIN — ACETAMINOPHEN 1000 MG: 500 TABLET ORAL at 20:47

## 2018-08-20 RX ADMIN — SODIUM CHLORIDE, POTASSIUM CHLORIDE, SODIUM LACTATE AND CALCIUM CHLORIDE: 600; 310; 30; 20 INJECTION, SOLUTION INTRAVENOUS at 07:20

## 2018-08-20 RX ADMIN — FENTANYL CITRATE 25 MCG: 50 INJECTION, SOLUTION INTRAMUSCULAR; INTRAVENOUS at 07:42

## 2018-08-20 RX ADMIN — BUPIVACAINE HYDROCHLORIDE AND EPINEPHRINE BITARTRATE 15 ML: 5; .005 INJECTION, SOLUTION PERINEURAL at 08:50

## 2018-08-20 RX ADMIN — OXYTOCIN-SODIUM CHLORIDE 0.9% IV SOLN 30 UNIT/500ML 100 ML/HR: 30-0.9/5 SOLUTION at 10:48

## 2018-08-20 RX ADMIN — PRENATAL VIT W/ FE FUMARATE-FA TAB 27-0.8 MG 1 TABLET: 27-0.8 TAB at 12:29

## 2018-08-20 RX ADMIN — OXYCODONE HYDROCHLORIDE 5 MG: 5 TABLET ORAL at 14:57

## 2018-08-20 RX ADMIN — ONDANSETRON 4 MG: 2 INJECTION INTRAMUSCULAR; INTRAVENOUS at 07:50

## 2018-08-20 RX ADMIN — IBUPROFEN 600 MG: 600 TABLET ORAL at 18:09

## 2018-08-20 RX ADMIN — PHENYLEPHRINE HYDROCHLORIDE 100 MCG: 10 INJECTION, SOLUTION INTRAMUSCULAR; INTRAVENOUS; SUBCUTANEOUS at 08:22

## 2018-08-20 RX ADMIN — GENTAMICIN SULFATE 120 MG: 40 INJECTION, SOLUTION INTRAMUSCULAR; INTRAVENOUS at 06:44

## 2018-08-20 RX ADMIN — SODIUM CHLORIDE, POTASSIUM CHLORIDE, SODIUM LACTATE AND CALCIUM CHLORIDE: 600; 310; 30; 20 INJECTION, SOLUTION INTRAVENOUS at 07:45

## 2018-08-20 RX ADMIN — ACETAMINOPHEN 1000 MG: 500 TABLET ORAL at 15:59

## 2018-08-20 RX ADMIN — PHENYLEPHRINE HYDROCHLORIDE 0.4 MCG/KG/MIN: 10 INJECTION, SOLUTION INTRAMUSCULAR; INTRAVENOUS; SUBCUTANEOUS at 07:43

## 2018-08-20 RX ADMIN — METHYLERGONOVINE MALEATE 200 MCG: 0.2 INJECTION INTRAMUSCULAR; INTRAVENOUS at 08:10

## 2018-08-20 RX ADMIN — SODIUM CHLORIDE, POTASSIUM CHLORIDE, SODIUM LACTATE AND CALCIUM CHLORIDE 1000 ML: 600; 310; 30; 20 INJECTION, SOLUTION INTRAVENOUS at 06:27

## 2018-08-20 RX ADMIN — OXYTOCIN-SODIUM CHLORIDE 0.9% IV SOLN 30 UNIT/500ML 500 ML: 30-0.9/5 SOLUTION at 08:09

## 2018-08-20 RX ADMIN — OXYCODONE HYDROCHLORIDE 10 MG: 5 TABLET ORAL at 18:09

## 2018-08-20 RX ADMIN — IBUPROFEN 600 MG: 600 TABLET ORAL at 12:29

## 2018-08-20 RX ADMIN — OXYCODONE HYDROCHLORIDE 10 MG: 5 TABLET ORAL at 20:47

## 2018-08-20 RX ADMIN — FERROUS SULFATE TAB 325 MG (65 MG ELEMENTAL FE) 325 MG: 325 (65 FE) TAB at 12:29

## 2018-08-20 ASSESSMENT — ACTIVITIES OF DAILY LIVING (ADL)
DRESS: 0-->INDEPENDENT
TOILETING: 0-->INDEPENDENT
COGNITION: 0 - NO COGNITION ISSUES REPORTED
TRANSFERRING: 0-->INDEPENDENT
RETIRED_COMMUNICATION: 0-->UNDERSTANDS/COMMUNICATES WITHOUT DIFFICULTY
BATHING: 0-->INDEPENDENT
SWALLOWING: 0-->SWALLOWS FOODS/LIQUIDS WITHOUT DIFFICULTY
FALL_HISTORY_WITHIN_LAST_SIX_MONTHS: NO
AMBULATION: 0-->INDEPENDENT
RETIRED_EATING: 0-->INDEPENDENT

## 2018-08-20 NOTE — IP AVS SNAPSHOT
[facility]    [address]     [place] [address]    Phone:  [phone]                                       After Visit Summary   8/20/2018    Eden Yanez    MRN: [id number]           After Visit Summary Signature Page     I have received my discharge instructions, and my questions have been answered. I have discussed any challenges I see with this plan with the nurse or doctor.    ..........................................................................................................................................  Patient/Patient Representative Signature      ..........................................................................................................................................  Patient Representative Print Name and Relationship to Patient    ..................................................               ................................................  Date                                            Time    ..........................................................................................................................................  Reviewed by Signature/Title    ...................................................              ..............................................  Date                                                            Time

## 2018-08-20 NOTE — ANESTHESIA PROCEDURE NOTES
Peripheral nerve/Neuraxial procedure note : intrathecal  Pre-Procedure    Location: OR      Pre-Anesthestic Checklist: patient identified, IV checked, risks and benefits discussed, informed consent, monitors and equipment checked and pre-op evaluation    Timeout  Correct Patient: Yes   Correct Procedure: Yes   Correct Site: Yes   Correct Laterality: N/A   Correct Position: Yes   Site Marked: N/A   .   Procedure Documentation  ASA 2  .    Procedure:    Intrathecal.  Insertion Site:L3-4  (midline approach)      Patient Prep;mask, sterile gloves, povidone-iodine 7.5% surgical scrub, patient draped.  .  Needle: Blu tip Spinal Needle (gauge): 25  Spinal/LP Needle Length (inches): 3.5 # of attempts: 1 and # of redirects:  Introducer used Introducer: 20 G .       Assessment/Narrative  Paresthesias: No.  .  .  clear CSF fluid removed while sitting   . Sensory Level: T5 and T6 Comments:  Pt tolerated procedure well.  Did not have any paresthesias.  Had patient sit up for 2 minutes after placement

## 2018-08-20 NOTE — PROGRESS NOTES
S: Transfer to postpartum  B:  birth @ 0807, brst feeding .  A: Mother and baby transferred to postpartum unit at 0950 via bed after completion of immediate recovery period. Patient oriented to room. Mother and baby bonding well and in satisfactory condition upon transfer.  R: Anticipate routine postpartum care.

## 2018-08-20 NOTE — ANESTHESIA PROCEDURE NOTES
Peripheral nerve/Neuraxial procedure note : TAP  Pre-Procedure    Location: OR      Pre-Anesthestic Checklist: patient identified, IV checked, site marked, risks and benefits discussed, informed consent, monitors and equipment checked, pre-op evaluation, at physician/surgeon's request and post-op pain management    Timeout  Correct Patient: Yes   Correct Procedure: Yes   Correct Site: Yes   Correct Laterality: N/A   Correct Position: Yes   Site Marked: Yes   .   Procedure Documentation    .    Procedure:  bilateral  TAP.  Local skin infiltrated with 1 mL of.     Ultrasound used to identify targeted nerve, plexus, or vascular marker and placed a needle adjacent to it., Ultrasound was used to visualize the spread of the anesthetic in close proximity to the above stated nerve.   Patient Prep;mask, sterile gloves, chlorhexidine gluconate and isopropyl alcohol.  Nerve Stim: Initial Level 0.05 mA. Lowest motor response mA..  Needle: insulated Needle Gauge: 22.  Needle Length (millimeters) 100  Insertion Method: Single Shot.       Assessment/Narrative  Injection made incrementally with aspirations every 5 mL..  The placement was negative for: blood aspirated, painful injection and site bleeding.  Bolus given via needle..   Secured via.   Complications: none. Comments:  Pt tolerated procedure well. Pt was still comfortable from her spinal anesthesia.  No complications noted.  Will follow if needed.

## 2018-08-20 NOTE — PROGRESS NOTES
S: Scheduled C/S  B: Patient is a  @ 39w2d gestation with history of previous C/S  A: Patient presents to [facility] for scheduled C/S. VS stable. 20 minute category 1 FHT strip obtained. IV placed, fluids initiated. Procedure explained to patient, patient educated on importance of skin-to-skin contact and intent for skin-to-skin initiation in OR. Questions answered. Consents signed. Patient prepped for surgery.  R: Will proceed with scheduled C/S as planned.

## 2018-08-20 NOTE — ANESTHESIA PREPROCEDURE EVALUATION
Anesthesia Evaluation     . Pt has had prior anesthetic. Type: Regional    No history of anesthetic complications          ROS/MED HX    ENT/Pulmonary:  - neg pulmonary ROS     Neurologic:  - neg neurologic ROS     Cardiovascular:  - neg cardiovascular ROS       METS/Exercise Tolerance:     Hematologic:  - neg hematologic  ROS       Musculoskeletal:  - neg musculoskeletal ROS       GI/Hepatic:     (+) GERD Symptomatic,       Renal/Genitourinary:  - ROS Renal section negative       Endo:  - neg endo ROS       Psychiatric:  - neg psychiatric ROS       Infectious Disease:  - neg infectious disease ROS       Malignancy:      - no malignancy   Other:    - neg other ROS                 Physical Exam  Normal systems: cardiovascular, pulmonary and dental    Airway   Mallampati: II  TM distance: >3 FB  Neck ROM: full    Dental     Cardiovascular   Rhythm and rate: regular and normal      Pulmonary    breath sounds clear to auscultation                    Anesthesia Plan      History & Physical Review  History and physical reviewed and following examination; no interval change.    ASA Status:  2 .    NPO Status:  > 6 hours    Plan for Spinal with Other induction. Maintenance will be Balanced.    PONV prophylaxis:  Ondansetron (or other 5HT-3) and Dexamethasone or Solumedrol       Postoperative Care  Postoperative pain management:  Peripheral nerve block (Single Shot).      Consents  Anesthetic plan, risks, benefits and alternatives discussed with:  Patient.  Use of blood products discussed: No .   .                          .

## 2018-08-20 NOTE — ANESTHESIA CARE TRANSFER NOTE
Patient: Eden Yanez    Procedure(s):  Repeat  Section - Wound Class: II-Clean Contaminated    Diagnosis: Historyof  section x2  Diagnosis Additional Information: No value filed.    Anesthesia Type:   Spinal     Note:  Airway :Room Air  Patient transferred to:PACU  Handoff Report: Identifed the Patient, Identified the Reponsible Provider, Reviewed the pertinent medical history, Discussed the surgical course, Reviewed Intra-OP anesthesia mangement and issues during anesthesia, Set expectations for post-procedure period and Allowed opportunity for questions and acknowledgement of understanding      Vitals: (Last set prior to Anesthesia Care Transfer)    CRNA VITALS  2018 0836 - 2018 0912      2018             SpO2: 99 %                Electronically Signed By: IFTIKHAR Harding CRNA  2018  9:12 AM

## 2018-08-20 NOTE — OP NOTE
[facility] Obstetrics Operative Note  Surgeon: Raheem Batista MD  Assistant:Miryam Perez MD  (The assistance of this surgeon was required due to the need for additional skilled surgical hands to retract and hold instruments due to the nature of the surgical procedure and risk of complications)    Assistant::Linda Salazar MD    PREOPERATIVE DIAGNOSIS:    Wishes Repeat Cesarian section       POSTOPERATIVE DIAGNOSIS:  Same     PROCEDURE:   Repeat low transverse  section     ANESTHESIA:  Spinal Anesthesia    TAP block     OPERATIVE FINDINGS: We delivered a male  infant with Apgars of  8 and 9  and weight of 8 pounds.    OPERATIVE DESCRIPTION:  After obtaining informed consent and after the establishment of satisfactory anesthesia, the patient was prepped and draped in the usual fashion for  section.  A Bell catheter was inserted in the urinary bladder.     A Pfannenstiel incision was made through the skin and carried down to the rectus fascia which was scored in the midline and then extended bilaterally with Singleton scissors.  The fascia was then  from the underlying rectus muscles sharply and bluntly and then the muscles were divided in the midline.  The peritoneum was then entered bluntly with a gloved finger and the incision was extended under direct visualization. My partner Dr. Perez assisted me in this process by holding the tissues in such a way to allow me adequate visualization and also helped with the cutting of the tissues.. This assistance was vital to the success of the surgery.   The bladder blade was then positioned.  The bladder flap was then mobilized using Metzenbaum scissors and the bladder blade was then repositioned.  A low transverse incision was made in the lower uterine segment and smiled gently upwards, and the last layer was made bluntly with gloved finger to avoid trauma to the fetus.    The vertex was then lifted through the uterine scar atraumatically  and the mouth and nostrils were bulb suctioned.  The body was then delivered atraumatically and the cord was double clamped and cut and the infant was taken to the warmer for observation.  See operative findings above.     The placenta was manually removed.  The uterine cavity was curetted with a moist lap sponge and all placental fragments and membranes removed.  There was some uterine atony observed initially and so the patient was given 0.2 mg Im methergine which resulted in improved uterine tone.  The uterus was exteriorized and the uterine incision was repaired with 0 Vicryl running locked suture and re-imbricated with 0 Vicryl.  After hemostasis was assured, the uterus was replaced in to the abdominal cavity and the paracolic gutters were irrigated and clots removed.  The uterine incision was examined again and found to be hemostatic.    The peritoneum was repaired with 2-0 Vicryl.  The rectus fascia was repaired with running 0 Vicryl.  The subcutaneous tissues were irrigated and found to be hemostatic.    The skin was closed with running 4-0 vicryl subcuticular sutures on a Sal needle.     The wound was then dressed.  Final sponge, needle and instrument counts were reported to me as correct.  Estimated blood loss was  1000 cc.  The urine remained clear throughout the operation and into recovery.   Mom and infant were taken to the Recovery Room in satisfactory condition and there were no complications.     Raheem Batista M.D.

## 2018-08-20 NOTE — PROGRESS NOTES
S:  Section Delivery  B: Repeat  Section @ 0807  A: Baby delivered, cord clamping delayed for 60-90 seconds, then brought to pre- warmed infant warmer. Infant stimulated and dried. Infant then brought to mother and placed skin to skin for bonding. Apgars 8/9. Educated mother on expected feeding readiness cues and encouraged her to observe for feeding cues. Mother informed that breast feeding assistance would be provided.   R: Mother and baby bonding well. Anticipate first feed within the hour.

## 2018-08-20 NOTE — IP AVS SNAPSHOT
MRN:[id number]                      After Visit Summary   8/20/2018    Eden Yanez    MRN: [id number]           Thank you!     Thank you for choosing [facility] for your care. Our goal is always to provide you with excellent care. Hearing back from our patients is one way we can continue to improve our services. Please take a few minutes to complete the written survey that you may receive in the mail after you visit with us. Thank you!        Patient Information     Date Of Birth          1990        Designated Caregiver       Most Recent Value    Caregiver    Will someone help with your care after discharge? no      About your hospital stay     You were admitted on:  August 20, 2018 You last received care in the:  [facility]    You were discharged on:  August 22, 2018       Who to Call     For medical emergencies, please call 911.  For non-urgent questions about your medical care, please call your primary care provider or clinic, [phone]  For questions related to your surgery, please call your surgery clinic        Attending Provider     Provider Specialty    Raheem Batista MD [facility]       Primary Care Provider Office Phone # Fax #    Miryam Perez [phone]      Your next 10 appointments already scheduled     Aug 27, 2018  4:00 PM CDT   ESTABLISHED PRENATAL with Miryam Perez MD   [facility] ([address] [address] [phone]              Further instructions from your care team       [facility] Discharge Instructions     Discharge disposition:  Discharged to home       Diet:  Regular       Activity See below       Follow-up: Follow up with Dr. Perez on Monday 8/27/18 at 4:15 pm       Additional instructions: The [facility] staff is available 24 hours 7 days for questions about you or your  baby.  Please don't hesitate to call with any concerns.        Additional Patient Information:  Enjoy beautiful rené Jones!    Instructions for Dr. Batista:     Notify us (or go to emergency room if after hours) if you develop heavy vaginal bleeding greater than a menstrual period, or fever over 100.5 degrees, or if the incision becomes red and/or looks infected, or if you have vomiting or increased pain.    You should abstain from intercourse for 6 weeks after surgery. Avoid bathing for 1 week but showers are ok. Limit lifting to 20 pounds for 12 full weeks after surgery. Avoid bending for several weeks after surgery. No driving for a minimum of two weeks, and only after that when you can put your foot on the brake pedal rapidly so you can react to stop car if necessary.         Call the clinic or if after hours call labor and delivery at [phone] if you have any other questions.       Raheem Batista MD, FACOG, FAAFP              , OB/GYN  Department.        Pending Results     No orders found from 8/18/2018 to 8/21/2018.            Statement of Approval     Ordered          08/22/18 0732  I have reviewed and agree with all the recommendations and orders detailed in this document.  EFFECTIVE NOW     Approved and electronically signed by:  Raheem Batista MD             Admission Information     Date & Time Provider Department Dept. Phone    8/20/2018 Raheem Batista MD [facility] [phone]      Your Vitals Were     Blood Pressure Pulse Temperature Respirations Last Period Pulse Oximetry    98/63 70 98.4  F (36.9  C) (Oral) 16 11/18/2017 (Approximate) 94%      MyChart Information     [facility] gives you secure access to your electronic health record. If you see a primary care provider, you can also send messages to your care team and make appointments. If you have questions, please call your primary care clinic.  If you do not have a primary care  provider, please call [phone] and they will assist you.        Care EveryWhere ID     This is your Care EveryWhere ID. This could be used by other organizations to access your [facility] medical records  [id number]        Equal Access to Services     YADI PATEL : Hadii aad ku hadrosalvafernando Montseramya, wadagobertoda luqadaha, qaybta kaalmada gianni, jocelynn jeanna luchoivania peters rodrigoshekhar fernández. So [facility] [phone].    ATENCIÓN: Si habla español, tiene a funez disposición servicios gratuitos de asistencia lingüística. Llame al [phone].    We comply with applicable federal civil rights laws and [place] laws. We do not discriminate on the basis of race, color, national origin, age, disability, sex, sexual orientation, or gender identity.               Review of your medicines      START taking        Dose / Directions    ferrous gluconate 324 (38 Fe) MG tablet   Commonly known as:  FERGON        Dose:  324 mg   Take 1 tablet (324 mg) by mouth daily (with breakfast)   Quantity:  60 tablet   Refills:  1       ibuprofen 600 MG tablet   Commonly known as:  ADVIL/MOTRIN        Dose:  600 mg   Take 1 tablet (600 mg) by mouth every 6 hours as needed for moderate pain   Quantity:  80 tablet   Refills:  1       oxyCODONE IR 5 MG tablet   Commonly known as:  ROXICODONE        Dose:  5-10 mg   Take 1-2 tablets (5-10 mg) by mouth every 3 hours as needed for moderate to severe pain   Quantity:  30 tablet   Refills:  0       senna-docusate 8.6-50 MG per tablet   Commonly known as:  SENOKOT-S;PERICOLACE        Dose:  1 tablet   Take 1 tablet by mouth 2 times daily as needed for constipation   Quantity:  60 tablet   Refills:  1         CONTINUE these medicines which have NOT CHANGED        Dose / Directions    loratadine 10 MG tablet   Commonly known as:  CLARITIN        Dose:  10 mg   Take 10 mg by mouth daily   Refills:  0       prenatal multivitamin plus iron 27-0.8 MG Tabs per tablet   Indication:  Pregnancy        Dose:  1 tablet    Take 1 tablet by mouth daily   Refills:  0         STOP taking     ferrous sulfate 325 (65 Fe) MG tablet   Commonly known as:  IRON           OMEGA DHA PO                Where to get your medicines      These medications were sent to [facility] [place], [place] - [address]  [address], [facility] [address]     Phone:  [phone]     ferrous gluconate 324 (38 Fe) MG tablet    ibuprofen 600 MG tablet    senna-docusate 8.6-50 MG per tablet         Some of these will need a paper prescription and others can be bought over the counter. Ask your nurse if you have questions.     Bring a paper prescription for each of these medications     oxyCODONE IR 5 MG tablet                Protect others around you: Learn how to safely use, store and throw away your medicines at www.disposemymeds.org.        Information about OPIOIDS     PRESCRIPTION OPIOIDS: WHAT YOU NEED TO KNOW   We gave you an opioid (narcotic) pain medicine. It is important to manage your pain, but opioids are not always the best choice. You should first try all the other options your care team gave you. Take this medicine for as short a time (and as few doses) as possible.    Some activities can increase your pain, such as bandage changes or therapy sessions. It may help to take your pain medicine 30 to 60 minutes before these activities. Reduce your stress by getting enough sleep, working on hobbies you enjoy and practicing relaxation or meditation. Talk to your care team about ways to manage your pain beyond prescription opioids.    These medicines have risks:    DO NOT drive when on new or higher doses of pain medicine. These medicines can affect your alertness and reaction times, and you could be arrested for driving under the influence (DUI). If you need to use opioids long-term, talk to your care team about driving.    DO NOT operate heavy machinery    DO NOT do any other dangerous activities while taking these  medicines.    DO NOT drink any alcohol while taking these medicines.     If the opioid prescribed includes acetaminophen, DO NOT take with any other medicines that contain acetaminophen. Read all labels carefully. Look for the word  acetaminophen  or  Tylenol.  Ask your pharmacist if you have questions or are unsure.    You can get addicted to pain medicines, especially if you have a history of addiction (chemical, alcohol or substance dependence). Talk to your care team about ways to reduce this risk.    All opioids tend to cause constipation. Drink plenty of water and eat foods that have a lot of fiber, such as fruits, vegetables, prune juice, apple juice and high-fiber cereal. Take a laxative (Miralax, milk of magnesia, Colace, Senna) if you don t move your bowels at least every other day. Other side effects include upset stomach, sleepiness, dizziness, throwing up, tolerance (needing more of the medicine to have the same effect), physical dependence and slowed breathing.    Store your pills in a secure place, locked if possible. We will not replace any lost or stolen medicine. If you don t finish your medicine, please throw away (dispose) as directed by your pharmacist. The [place] Pollution Control Agency has more information about safe disposal: https://www.pca.[facility].mn.us/living-green/managing-unwanted-medications             Medication List: This is a list of all your medications and when to take them. Check marks below indicate your daily home schedule. Keep this list as a reference.      Medications           Morning Afternoon Evening Bedtime As Needed    ferrous gluconate 324 (38 Fe) MG tablet   Commonly known as:  FERGON   Take 1 tablet (324 mg) by mouth daily (with breakfast)                                ibuprofen 600 MG tablet   Commonly known as:  ADVIL/MOTRIN   Take 1 tablet (600 mg) by mouth every 6 hours as needed for moderate pain   Last time this was given:  600 mg on 8/22/2018  8:34 AM                                 loratadine 10 MG tablet   Commonly known as:  CLARITIN   Take 10 mg by mouth daily                                oxyCODONE IR 5 MG tablet   Commonly known as:  ROXICODONE   Take 1-2 tablets (5-10 mg) by mouth every 3 hours as needed for moderate to severe pain   Last time this was given:  10 mg on 8/22/2018 11:28 AM                                prenatal multivitamin plus iron 27-0.8 MG Tabs per tablet   Take 1 tablet by mouth daily   Last time this was given:  1 tablet on 8/22/2018  8:33 AM                                senna-docusate 8.6-50 MG per tablet   Commonly known as:  SENOKOT-S;PERICOLACE   Take 1 tablet by mouth 2 times daily as needed for constipation   Last time this was given:  2 tablets on 8/22/2018  8:33 AM

## 2018-08-21 PROBLEM — D62 ANEMIA DUE TO BLOOD LOSS, ACUTE: Status: ACTIVE | Noted: 2018-08-21

## 2018-08-21 LAB — HGB BLD-MCNC: 10 G/DL (ref 11.7–15.7)

## 2018-08-21 PROCEDURE — 25000132 ZZH RX MED GY IP 250 OP 250 PS 637: Performed by: OBSTETRICS & GYNECOLOGY

## 2018-08-21 PROCEDURE — 36415 COLL VENOUS BLD VENIPUNCTURE: CPT | Performed by: OBSTETRICS & GYNECOLOGY

## 2018-08-21 PROCEDURE — 25000132 ZZH RX MED GY IP 250 OP 250 PS 637: Performed by: FAMILY MEDICINE

## 2018-08-21 PROCEDURE — 25000128 H RX IP 250 OP 636: Performed by: OBSTETRICS & GYNECOLOGY

## 2018-08-21 PROCEDURE — 85018 HEMOGLOBIN: CPT | Performed by: OBSTETRICS & GYNECOLOGY

## 2018-08-21 PROCEDURE — 12000029 ZZH R&B OB INTERMEDIATE

## 2018-08-21 RX ORDER — ACETAMINOPHEN 325 MG/1
325-650 TABLET ORAL EVERY 4 HOURS PRN
Status: DISCONTINUED | OUTPATIENT
Start: 2018-08-21 | End: 2018-08-22 | Stop reason: HOSPADM

## 2018-08-21 RX ADMIN — OXYCODONE HYDROCHLORIDE 10 MG: 5 TABLET ORAL at 03:50

## 2018-08-21 RX ADMIN — ACETAMINOPHEN 1000 MG: 500 TABLET ORAL at 03:50

## 2018-08-21 RX ADMIN — ACETAMINOPHEN 325 MG: 325 TABLET ORAL at 14:06

## 2018-08-21 RX ADMIN — SODIUM CHLORIDE, SODIUM LACTATE, POTASSIUM CHLORIDE, CALCIUM CHLORIDE AND DEXTROSE MONOHYDRATE: 5; 600; 310; 30; 20 INJECTION, SOLUTION INTRAVENOUS at 00:07

## 2018-08-21 RX ADMIN — ACETAMINOPHEN 650 MG: 325 TABLET ORAL at 09:39

## 2018-08-21 RX ADMIN — Medication 30 G: at 10:55

## 2018-08-21 RX ADMIN — OXYCODONE HYDROCHLORIDE 10 MG: 5 TABLET ORAL at 00:08

## 2018-08-21 RX ADMIN — IBUPROFEN 600 MG: 600 TABLET ORAL at 18:26

## 2018-08-21 RX ADMIN — OXYCODONE HYDROCHLORIDE 10 MG: 5 TABLET ORAL at 22:58

## 2018-08-21 RX ADMIN — ACETAMINOPHEN 650 MG: 325 TABLET ORAL at 18:26

## 2018-08-21 RX ADMIN — PRENATAL VIT W/ FE FUMARATE-FA TAB 27-0.8 MG 1 TABLET: 27-0.8 TAB at 07:53

## 2018-08-21 RX ADMIN — OXYCODONE HYDROCHLORIDE 10 MG: 5 TABLET ORAL at 08:05

## 2018-08-21 RX ADMIN — IBUPROFEN 600 MG: 600 TABLET ORAL at 12:36

## 2018-08-21 RX ADMIN — OXYCODONE HYDROCHLORIDE 10 MG: 5 TABLET ORAL at 20:15

## 2018-08-21 RX ADMIN — ACETAMINOPHEN 650 MG: 325 TABLET ORAL at 22:58

## 2018-08-21 RX ADMIN — OXYCODONE HYDROCHLORIDE 10 MG: 5 TABLET ORAL at 10:54

## 2018-08-21 RX ADMIN — OXYCODONE HYDROCHLORIDE 10 MG: 5 TABLET ORAL at 14:07

## 2018-08-21 RX ADMIN — IBUPROFEN 600 MG: 600 TABLET ORAL at 06:21

## 2018-08-21 RX ADMIN — IBUPROFEN 600 MG: 600 TABLET ORAL at 00:08

## 2018-08-21 RX ADMIN — OXYCODONE HYDROCHLORIDE 10 MG: 5 TABLET ORAL at 17:17

## 2018-08-21 RX ADMIN — FERROUS SULFATE TAB 325 MG (65 MG ELEMENTAL FE) 325 MG: 325 (65 FE) TAB at 07:52

## 2018-08-21 NOTE — PROGRESS NOTES
S: Shift review  B:Eden is a , day 1 post  birth.  A: Stable post-op, incisional dressing is dry & intact , Lung sounds-clear, bowel sounds active in all 4 quadrants, assistance with mobility, pain control achieved with p.o. pain meds. Handles baby with confidence.   Pt had difficulty with pain at beginning of shift. Currently managed.  Pt refused to get out of bed this evening related to pain.  Once pain was managed nurse was able to get pt to sit at edge of bed. Activity slow and guarded. Needing encouragement for activity. Will remove manuel this morning early and encourage ambulation.    R: Continue with plan of care. Offer pain meds routinely.

## 2018-08-21 NOTE — PROGRESS NOTES
[facility] Obstetrics Post-Op / Progress Note         Assessment and Plan:    Assessment:   Post-operative day #1  Low transverse repeat  section  L&D complications: None      Doing well.  No immediate surgical complications identified.  No excessive bleeding  Pain not yet well-controlled but improving.      Plan:   Ambulation encouraged later today, will start by sitting up in chair  Breast feeding strategies discussed  Pain control measures as needed, adjusted her dose of tylenol to allow more frequent dosing  RhoGam not needed, baby RH negative.   Anticipate discharge in 1-2 days  Continue to work with lactation consultant as needed. Will offer nipple matta, has APNO           Interval History:   Doing well.  Pain is not well-controlled.  Gets bouts of sharp incisional pain along with cramping, both worse with nursing. No fevers.  No history of wound drainage, warmth or significant erythema.  Good appetite.  Denies chest pain, shortness of breath, nausea or vomiting.  Just has been up to the bathroom once.  Breastfeeding well.          Significant Problems:    None          Review of Systems:    as above          Medications:     All medications related to the patient's surgery have been reviewed  Current Facility-Administered Medications   Medication     acetaminophen (TYLENOL) tablet 325-650 mg     APNO ointment     [START ON 2018] bisacodyl (DULCOLAX) Suppository 10 mg     Blood Bank will determine if patient is eligible for and the proper dosage of rho (D) immune globulin     dextrose 5% in lactated ringers infusion     diphenhydrAMINE (BENADRYL) capsule 25 mg    Or     diphenhydrAMINE (BENADRYL) injection 25 mg     ferrous sulfate (IRON) tablet 325 mg     hydrocortisone 2.5 % cream     ibuprofen (ADVIL/MOTRIN) tablet 600 mg     lanolin ointment     lidocaine (LMX4) kit     lidocaine 1 % 1 mL     magnesium hydroxide (MILK OF MAGNESIA) suspension 30 mL     naloxone (NARCAN) injection  0.1-0.4 mg     No MMR Needed -  Assessment: Patient does not need MMR vaccine     NO Rho (D)  immune globulin (RhoGam) needed  - mother INELIGIBLE     No Tdap Needed - Assessment: Patient does not need Tdap vaccine     ondansetron (ZOFRAN) injection 4 mg     oxyCODONE IR (ROXICODONE) tablet 5-10 mg     oxytocin (PITOCIN) 30 units in 500 mL 0.9% NaCl infusion     prenatal multivitamin plus iron per tablet 1 tablet     senna-docusate (SENOKOT-S;PERICOLACE) 8.6-50 MG per tablet 1 tablet    Or     senna-docusate (SENOKOT-S;PERICOLACE) 8.6-50 MG per tablet 2 tablet     simethicone (MYLICON) chewable tablet 80 mg     sodium chloride (PF) 0.9% PF flush 3 mL     sodium chloride (PF) 0.9% PF flush 3 mL     [START ON 8/22/2018] sodium phosphate (FLEET ENEMA) 1 enema             Physical Exam:     All vitals stable  Patient Vitals for the past 24 hrs:   BP Temp Temp src Pulse Heart Rate Resp SpO2   08/21/18 0735 (!) 89/53 98  F (36.7  C) Oral - 59 16 95 %   08/21/18 0357 95/51 97.5  F (36.4  C) Oral 65 - 16 96 %   08/21/18 0000 93/54 97.3  F (36.3  C) Oral 71 - 16 -   08/20/18 2000 94/54 97  F (36.1  C) Oral 94 - 18 -   08/20/18 1500 119/67 98.5  F (36.9  C) Oral - 68 16 95 %     Vitals noted.  Patient alert, oriented, and in no acute distress. CV:  RRR without murmur. Respiratory:  Lungs clear to auscultation bilaterally. Abdomen soft, tender with palpation of the fundus.  Dressing clean dry and intact. Extremities without edema. Neuro intact.           Data:     Results for orders placed or performed during the hospital encounter of 08/20/18   Hemoglobin   Result Value Ref Range    Hemoglobin 10.0 (L) 11.7 - 15.7 g/dL   Rho (D) immune globulin (RhoGam) Lab Study   Result Value Ref Range    Rhogam Order Order received    Rh negative immune globulin study   Result Value Ref Range    Blood Bank Comment Not suitable for Rh Immune Globulin  BABY RH NEG           Miryam Perez MD

## 2018-08-21 NOTE — ANESTHESIA POSTPROCEDURE EVALUATION
Patient: Eden Yanez    Procedure(s):  Repeat  Section - Wound Class: II-Clean Contaminated    Diagnosis:Historyof  section x2  Diagnosis Additional Information: No value filed.    Anesthesia Type:  Spinal    Note:  Anesthesia Post Evaluation    Patient location during evaluation: Floor and Bedside  Patient participation: Able to fully participate in evaluation  Level of consciousness: awake  Pain management: adequate  Airway patency: patent  Cardiovascular status: acceptable and hemodynamically stable  Respiratory status: acceptable, room air and nonlabored ventilation  Hydration status: stable  PONV: none     Anesthetic complications: None    Comments: Patient was happy with the anesthesia care received and no anesthesia related complications were noted.  I will follow up with the patient again if it is needed.        Last vitals:  Vitals:    18 0357 18 0735 18 1536   BP: 95/51 (!) 89/53 107/59   Pulse: 65  72   Resp: 16 16 18   Temp: 97.5  F (36.4  C) 98  F (36.7  C) 96.8  F (36  C)   SpO2: 96% 95%          Electronically Signed By: IFTIKHAR Harding CRNA  2018  4:33 PM

## 2018-08-21 NOTE — PROGRESS NOTES
Subjective: Eden is now postopday # 1. She reports adequate pain control and no other c/o.     Objective: VSS. BP 95/51  Pulse 65  Temp 97.5  F (36.4  C) (Oral)  Resp 16  LMP 2017 (Approximate)  SpO2 96%  Breastfeeding? Unknown    Chest is clear to auscultation. No wheezes, rales or rhonchi heard. cardiac exam is normal with s1, s2, no murmurs or adventitious sounds.Normal rate and rhythm is heard.    Abdomen is soft and fundus is firm and not appreciably tender.    Extremities without edema.Incision is  still dressed.     HGB:pending    Assessment: 1.stable postop course on post op day #   from  section.      Plan: Hope to discharge in 1-2 days       RICARDA Batista MD

## 2018-08-21 NOTE — PLAN OF CARE
Problem: Postpartum ( Delivery) (Adult,Obstetrics,Pediatric)  Goal: Signs and Symptoms of Listed Potential Problems Will be Absent, Minimized or Managed (Postpartum)  Signs and symptoms of listed potential problems will be absent, minimized or managed by discharge/transition of care (reference Postpartum ( Delivery) (Adult,Obstetrics,Pediatric) CPG).  Outcome: Therapy, progress toward functional goals as expected  Pt states that pain is under better control. Taking Oxycodone and Tylenol. Smiled. Expressed more confidence with feeding baby. Has voided and plans to walk in [place] soon. Stated she plans to shower tonight. Tolerating 100% of regular diet. FF and small amount of lochia. Dressing with small amount of shadowing. Will continue to monitor anxiety, pain, activity tolerance, breast-feeding.

## 2018-08-22 VITALS
OXYGEN SATURATION: 94 % | HEART RATE: 78 BPM | DIASTOLIC BLOOD PRESSURE: 68 MMHG | RESPIRATION RATE: 16 BRPM | TEMPERATURE: 97.9 F | SYSTOLIC BLOOD PRESSURE: 98 MMHG

## 2018-08-22 PROCEDURE — 25000132 ZZH RX MED GY IP 250 OP 250 PS 637: Performed by: OBSTETRICS & GYNECOLOGY

## 2018-08-22 PROCEDURE — 25000132 ZZH RX MED GY IP 250 OP 250 PS 637: Performed by: FAMILY MEDICINE

## 2018-08-22 RX ORDER — NALOXONE HYDROCHLORIDE 0.4 MG/ML
.1-.4 INJECTION, SOLUTION INTRAMUSCULAR; INTRAVENOUS; SUBCUTANEOUS
Status: DISCONTINUED | OUTPATIENT
Start: 2018-08-22 | End: 2018-08-22

## 2018-08-22 RX ORDER — ONDANSETRON 4 MG/1
4 TABLET, ORALLY DISINTEGRATING ORAL EVERY 30 MIN PRN
Status: DISCONTINUED | OUTPATIENT
Start: 2018-08-22 | End: 2018-08-22

## 2018-08-22 RX ORDER — FENTANYL CITRATE 50 UG/ML
25-50 INJECTION, SOLUTION INTRAMUSCULAR; INTRAVENOUS
Status: DISCONTINUED | OUTPATIENT
Start: 2018-08-22 | End: 2018-08-22

## 2018-08-22 RX ORDER — SODIUM CHLORIDE, SODIUM LACTATE, POTASSIUM CHLORIDE, CALCIUM CHLORIDE 600; 310; 30; 20 MG/100ML; MG/100ML; MG/100ML; MG/100ML
INJECTION, SOLUTION INTRAVENOUS CONTINUOUS
Status: DISCONTINUED | OUTPATIENT
Start: 2018-08-22 | End: 2018-08-22

## 2018-08-22 RX ORDER — OXYCODONE HYDROCHLORIDE 5 MG/1
5-10 TABLET ORAL
Qty: 30 TABLET | Refills: 0 | Status: SHIPPED | OUTPATIENT
Start: 2018-08-22 | End: 2019-03-01

## 2018-08-22 RX ORDER — IBUPROFEN 600 MG/1
600 TABLET, FILM COATED ORAL EVERY 6 HOURS PRN
Qty: 80 TABLET | Refills: 1 | Status: SHIPPED | OUTPATIENT
Start: 2018-08-22 | End: 2019-03-01

## 2018-08-22 RX ORDER — ONDANSETRON 2 MG/ML
4 INJECTION INTRAMUSCULAR; INTRAVENOUS EVERY 30 MIN PRN
Status: DISCONTINUED | OUTPATIENT
Start: 2018-08-22 | End: 2018-08-22

## 2018-08-22 RX ORDER — AMOXICILLIN 250 MG
1 CAPSULE ORAL 2 TIMES DAILY PRN
Qty: 60 TABLET | Refills: 1 | Status: SHIPPED | OUTPATIENT
Start: 2018-08-22 | End: 2019-03-01

## 2018-08-22 RX ORDER — FERROUS GLUCONATE 324(38)MG
324 TABLET ORAL
Qty: 60 TABLET | Refills: 1 | Status: SHIPPED | OUTPATIENT
Start: 2018-08-22 | End: 2019-03-01

## 2018-08-22 RX ORDER — ALBUTEROL SULFATE 0.83 MG/ML
2.5 SOLUTION RESPIRATORY (INHALATION) EVERY 4 HOURS PRN
Status: DISCONTINUED | OUTPATIENT
Start: 2018-08-22 | End: 2018-08-22

## 2018-08-22 RX ADMIN — PRENATAL VIT W/ FE FUMARATE-FA TAB 27-0.8 MG 1 TABLET: 27-0.8 TAB at 08:33

## 2018-08-22 RX ADMIN — OXYCODONE HYDROCHLORIDE 10 MG: 5 TABLET ORAL at 03:14

## 2018-08-22 RX ADMIN — IBUPROFEN 600 MG: 600 TABLET ORAL at 14:32

## 2018-08-22 RX ADMIN — OXYCODONE HYDROCHLORIDE 10 MG: 5 TABLET ORAL at 11:28

## 2018-08-22 RX ADMIN — ACETAMINOPHEN 650 MG: 325 TABLET ORAL at 07:21

## 2018-08-22 RX ADMIN — OXYCODONE HYDROCHLORIDE 10 MG: 5 TABLET ORAL at 15:47

## 2018-08-22 RX ADMIN — OXYCODONE HYDROCHLORIDE 10 MG: 5 TABLET ORAL at 07:21

## 2018-08-22 RX ADMIN — SENNOSIDES AND DOCUSATE SODIUM 2 TABLET: 8.6; 5 TABLET ORAL at 08:33

## 2018-08-22 RX ADMIN — ACETAMINOPHEN 650 MG: 325 TABLET ORAL at 11:28

## 2018-08-22 RX ADMIN — IBUPROFEN 600 MG: 600 TABLET ORAL at 02:24

## 2018-08-22 RX ADMIN — ACETAMINOPHEN 650 MG: 325 TABLET ORAL at 03:14

## 2018-08-22 RX ADMIN — IBUPROFEN 600 MG: 600 TABLET ORAL at 08:34

## 2018-08-22 RX ADMIN — ACETAMINOPHEN 650 MG: 325 TABLET ORAL at 15:47

## 2018-08-22 RX ADMIN — FERROUS SULFATE TAB 325 MG (65 MG ELEMENTAL FE) 325 MG: 325 (65 FE) TAB at 08:34

## 2018-08-22 NOTE — PLAN OF CARE
Problem: Postpartum ( Delivery) (Adult,Obstetrics,Pediatric)  Goal: Signs and Symptoms of Listed Potential Problems Will be Absent, Minimized or Managed (Postpartum)  Signs and symptoms of listed potential problems will be absent, minimized or managed by discharge/transition of care (reference Postpartum ( Delivery) (Adult,Obstetrics,Pediatric) CPG).   S: Shift review  B:Eden is a , day one post  birth.  A: Stable post-op, Lung sounds-clear, bowel sounds active in all 4 quadrants, independent with mobility, pain control achieved with p.o. pain meds. Showered tonight. Abdominal incision with steristrips KYLER, C/D/I. Handles baby with confidence.  R: Continue with plan of care. Offer pain meds routinely.

## 2018-08-22 NOTE — DISCHARGE SUMMARY
Obstetrical Discharge Summary:    Date of Admission: 18    Date of Discharge: 18    Hospital course:  This patient is a  27 year old female  3 Para 2002 who presented  electively  for  section   and delivered a    Male infant   without complications. By the day of discharge she was eating and voiding well, ambulating well and tolerating her pain well. Exam on day of discharge revealed stable vital signs: BP 98/49  Pulse 75  Temp 97.8  F (36.6  C) (Oral)  Resp 20  LMP 2017 (Approximate)  SpO2 94%  Breastfeeding? Unknown, chest was clear to auscultation, cardiac exam was normal, abdomen was soft and nontender, fundus was firm, and the  incision was clean ,dry and intact  and so I felt she could be safely discharged.    Discharge medications: see med rec form    Activity: No intercourse for 6 weeks. Limit lifting to 10 pounds for 6 weeks. No baths for 2 weeks- may shower anytime.    Followup: within 1 week for  incision check and   In 6-8 weeks for postpartum check.   Recheck acutely if fever, heavy vaginal bleeding greater than menses, increased pain or any other concerns.    Discharge Diagnosis:   1.  Term pregnancy      2.Anemia, dilutional and acute blood loss-expected    Procedures: 1.    section        Complications: None     RICARDA Batista MD

## 2018-08-22 NOTE — DISCHARGE INSTRUCTIONS
[facility] Discharge Instructions     Discharge disposition:  Discharged to home       Diet:  Regular       Activity See below       Follow-up: Follow up with Dr. Perez on Monday 8/27/18 at 4:15 pm       Additional instructions: The birthplace staff is available 24 hours 7 days for questions about you or your baby.  Please don't hesitate to call with any concerns.        Additional Patient Information:  Enjoy beautiful rené Jones!    Instructions for Dr. Batista:     Notify us (or go to emergency room if after hours) if you develop heavy vaginal bleeding greater than a menstrual period, or fever over 100.5 degrees, or if the incision becomes red and/or looks infected, or if you have vomiting or increased pain.    You should abstain from intercourse for 6 weeks after surgery. Avoid bathing for 1 week but showers are ok. Limit lifting to 20 pounds for 12 full weeks after surgery. Avoid bending for several weeks after surgery. No driving for a minimum of two weeks, and only after that when you can put your foot on the brake pedal rapidly so you can react to stop car if necessary.         Call the clinic or if after hours call labor and delivery at [phone] if you have any other questions.       Raheem Batista MD, FACOG, FAAFP              , OB/GYN  Department.

## 2018-08-22 NOTE — PLAN OF CARE
Problem: Patient Care Overview  Goal: Plan of Care/Patient Progress Review  Outcome: Improving  Pt has pain to abdominal incision, prn Ibuprofen, oxycodone and tylenol given. Incision site is WNL, steri-strips intact, KYLER, no drainage. Vss. Voiding well. Is confident with infant cares, brestfeeding infant independently. Will continue with plan of care.

## 2018-08-22 NOTE — PROGRESS NOTES
S-(situation): Discharge  to home with  and baby.    B-(background): Patient had a  delivery with no complications. Baby boy Baby's name Kartik Perez), breast: . Support person's name Nate.     A-(assessment): Pain controlled with oral pain meds. Breastfeeding independently.    R-(recommendations):  Discharge instructions reviewed and questions answered.  Belongings gathered and returned to the patient. Agreed to follow up in 6 weeks or sooner with any question or concerns. Incision check in 2 days.    Nursing Discharge Checklist:    Pneumovax screened and given, if appropriate: N/A  Influenza vaccine screened and given, if appropriate: N/A  Staples removed (): N/A  Breast milk returned: N/A  Hydrogel pads sent home:N/A  Birth Certificate Done: YES

## 2018-08-24 ENCOUNTER — PRENATAL OFFICE VISIT (OUTPATIENT)
Dept: FAMILY MEDICINE | Facility: CLINIC | Age: 28
End: 2018-08-24

## 2018-08-24 VITALS
BODY MASS INDEX: 27.84 KG/M2 | DIASTOLIC BLOOD PRESSURE: 60 MMHG | OXYGEN SATURATION: 97 % | WEIGHT: 162.2 LBS | HEART RATE: 122 BPM | TEMPERATURE: 97.8 F | SYSTOLIC BLOOD PRESSURE: 116 MMHG

## 2018-08-24 DIAGNOSIS — Z98.891 S/P CESAREAN SECTION: Primary | ICD-10-CM

## 2018-08-24 PROCEDURE — 99024 POSTOP FOLLOW-UP VISIT: CPT | Performed by: FAMILY MEDICINE

## 2018-08-24 ASSESSMENT — PAIN SCALES - GENERAL: PAINLEVEL: MILD PAIN (2)

## 2018-08-24 NOTE — MR AVS SNAPSHOT
After Visit Summary   2018    Eden Yanez    MRN: [id number]           Patient Information     Date Of Birth          1990        Visit Information        Provider Department      2018 1:45 PM Miryam Perez MD [facility]        Today's Diagnoses     S/P  section    -  1       Follow-ups after your visit        Your next 10 appointments already scheduled     Oct 10, 2018 11:30 AM CDT   Post Partum with Miryam Perez MD   [facility]    [address] [place] [id number] [phone]              Who to contact     If you have questions or need follow up information about today's clinic visit or your schedule please contact [facility] directly at [phone].  Normal or non-critical lab and imaging results will be communicated to you by MyChart, letter or phone within 4 business days after the clinic has received the results. If you do not hear from us within 7 days, please contact the clinic through MyChart or phone. If you have a critical or abnormal lab result, we will notify you by phone as soon as possible.  Submit refill requests through [employer] or call your pharmacy and they will forward the refill request to us. Please allow 3 business days for your refill to be completed.          Additional Information About Your Visit        MyChart Information     [employer] gives you secure access to your electronic health record. If you see a primary care provider, you can also send messages to your care team and make appointments. If you have questions, please call your primary care clinic.  If you do not have a primary care provider, please call [phone] and they will assist you.        Care EveryWhere ID     This is your Care EveryWhere ID. This could be used by other organizations to access your [facility] medical records  [id number]        Your Vitals  Were     Pulse Temperature Last Period Pulse Oximetry Breastfeeding? BMI (Body Mass Index)    122 97.8  F (36.6  C) (Temporal) 11/18/2017 (Approximate) 97% Yes 27.84 kg/m2       Blood Pressure from Last 3 Encounters:   08/24/18 116/60   08/22/18 98/68   08/15/18 (!) 88/52    Weight from Last 3 Encounters:   08/24/18 162 lb 3.2 oz (73.6 kg)   08/15/18 167 lb 3.2 oz (75.8 kg)   08/15/18 167 lb 3.2 oz (75.8 kg)              Today, you had the following     No orders found for display       Primary Care Provider Office Phone # Fax #    Miryam Anay Perez [phone] [address] [place] [address]        Equal Access to Services     [facility] : Hadii aad ku hadasho Soomaali, waaxda luqadaha, qaybta kaalmada adeegyada, jocelynn massey . So [facility] [phone].    ATENCIÓN: Si habla español, tiene a funez disposición servicios gratuitos de asistencia lingüística. Llame al [phone].    We comply with applicable federal civil rights laws and [place] laws. We do not discriminate on the basis of race, color, national origin, age, disability, sex, sexual orientation, or gender identity.            Thank you!     Thank you for choosing [facility]  for your care. Our goal is always to provide you with excellent care. Hearing back from our patients is one way we can continue to improve our services. Please take a few minutes to complete the written survey that you may receive in the mail after your visit with us. Thank you!             Your Updated Medication List - Protect others around you: Learn how to safely use, store and throw away your medicines at www.disposemymeds.org.          This list is accurate as of 8/24/18 11:02 PM.  Always use your most recent med list.                   Brand Name Dispense Instructions for use Diagnosis    ferrous gluconate 324 (38 Fe) MG tablet    FERGON    60 tablet    Take 1 tablet (324 mg) by mouth daily (with  breakfast)    S/P  section       ibuprofen 600 MG tablet    ADVIL/MOTRIN    80 tablet    Take 1 tablet (600 mg) by mouth every 6 hours as needed for moderate pain    S/P  section       loratadine 10 MG tablet    CLARITIN     Take 10 mg by mouth daily        oxyCODONE IR 5 MG tablet    ROXICODONE    30 tablet    Take 1-2 tablets (5-10 mg) by mouth every 3 hours as needed for moderate to severe pain    S/P  section       prenatal multivitamin plus iron 27-0.8 MG Tabs per tablet      Take 1 tablet by mouth daily        senna-docusate 8.6-50 MG per tablet    SENOKOT-S;PERICOLACE    60 tablet    Take 1 tablet by mouth 2 times daily as needed for constipation    S/P  section

## 2018-08-25 NOTE — PROGRESS NOTES
"Eden Yanez  Gender: female  : 1990  PO   [facility] [address]  [phone] (home) none (work)  Medical Record: [id number]  Primary Care Provider: Miryam Perez       [facility]   ?   Discharge Phone Call: Key Words/Key Times     How are you and the baby?     How are feedings going?     Voiding & Stooling?     Any questions or concerns?     Follow-up appointment?       We want to provide excellent care here at [facility]. Do you have any feedback for us that would help us improve?     Call back COMMENTS:         Attempted Calls:   ____Home # \"disconnected\"  Called cell [phone] L/M_____     __________  "

## 2018-08-25 NOTE — PROGRESS NOTES
Eden Yanez is a 27 year old  female here for follow up on her recent .  She needs an incision check.      Obstetric History       T3      L2     SAB0   TAB1   Ectopic0   Multiple0   Live Births2       # Outcome Date GA Lbr Michelet/2nd Weight Sex Delivery Anes PTL Lv   4 Term 18 39w2d  8 lb 0.4 oz (3.64 kg) M CS-LTranv         Name: SHELLY YANEZ      Apgar1:  8                Apgar5: 9   3 Term 16 38w5d  7 lb 4 oz (3.289 kg) M CS-LTranv   RICK      Name: Keith      Apgar1:  9                Apgar5: 9   2 Term 14 42w0d / 06:19 8 lb 14.3 oz (4.035 kg) F CS-LTranv EPI,Spinal,[facility] Y RICK      Name: Francia      Complications: Fetal heart rate decelerations affecting management of mother      Apgar1:  9                Apgar5: 9   1 TAB 2009     TAB         Obstetric Comments   EDC 2018 based on approximate LMP and matched by early u/s. Parenting with  Nate.           She is doing well.  She denies bleeding or drainage from the incision. Pain is fairly well controlled.  She hasn't used any narcotics today but is sore.   No fever.      O:  Vitals noted.  Patient alert, oriented, and in no acute distress. Her incision appears to be healing well without wound dehiscence, bleeding, erythema or drainage.    Uterine fundus is mildly tender with palpation, firm and below the umbilicus.     Extremities warm and dry without cyanosis, clubbing or edema.     A:  S/p        Wound check    P:  Continue routine postpartum check up at 8 weeks.  Will f/u sooner with any concerns, watch for redness, drainage, or warmth of the incision.  Wound care discussed.      Miryam Perez MD

## 2018-08-25 NOTE — PROGRESS NOTES
"Eden Beau  Gender: female  : 1990  PO   [facility] [address]  [phone] (home) none (work)  Medical Record: [id number]  Primary Care Provider: Miryam Perez       [facility]   ?   Discharge Phone Call: Key Words/Key Times     How are you and the baby?     How are feedings going?     Voiding & Stooling?     Any questions or concerns?     Follow-up appointment?       We want to provide excellent care here at [facility]. Do you have any feedback for us that would help us improve?     Call back COMMENTS:         Attempted Calls:   ___18 Home phone \"disconnected\" Called mobile [phone]   ,  L/M______     _________  "

## 2018-10-05 ENCOUNTER — TELEPHONE (OUTPATIENT)
Dept: FAMILY MEDICINE | Facility: CLINIC | Age: 28
End: 2018-10-05

## 2018-10-05 NOTE — TELEPHONE ENCOUNTER
Unfortunately, there is no space to be able to move their appointments to Tuesday 10/9. Please advise patient.   Marisol Zhong CMA

## 2018-10-05 NOTE — TELEPHONE ENCOUNTER
Called patient and rescheduled appt for Dec at patients request.  Thank you,  Lina Valencia  Patient Representative

## 2018-10-05 NOTE — TELEPHONE ENCOUNTER
Reason for Call:  Same Day Appointment, Requested Provider:  Miryam Perez M.D.    PCP: Miryam Perez    Reason for visit: Pt is scheduled for a post partum on 10.10 & asking to be moved to Tues 10.9 afternoon or 10/12 afternoon, please advise.  Baby was also going to come at 10:30 & would need an appt as well.    Duration of symptoms:     Have you been treated for this in the past? Yes    Additional comments:     Can we leave a detailed message on this number? YES    Phone number patient can be reached at: Cell number on file:    Telephone Information:   Mobile [phone]       Best Time:     Call taken on 10/5/2018 at 11:35 AM by Lina Valencia

## 2019-03-01 ENCOUNTER — PRENATAL OFFICE VISIT (OUTPATIENT)
Dept: FAMILY MEDICINE | Facility: CLINIC | Age: 29
End: 2019-03-01

## 2019-03-01 VITALS
DIASTOLIC BLOOD PRESSURE: 62 MMHG | SYSTOLIC BLOOD PRESSURE: 98 MMHG | WEIGHT: 144.4 LBS | OXYGEN SATURATION: 100 % | HEIGHT: 65 IN | BODY MASS INDEX: 24.06 KG/M2 | RESPIRATION RATE: 14 BRPM | HEART RATE: 90 BPM | TEMPERATURE: 97.1 F

## 2019-03-01 PROCEDURE — 99207 ZZC POST PARTUM EXAM: CPT | Performed by: FAMILY MEDICINE

## 2019-03-01 ASSESSMENT — MIFFLIN-ST. JEOR: SCORE: 1380.87

## 2019-03-01 ASSESSMENT — PAIN SCALES - GENERAL: PAINLEVEL: NO PAIN (0)

## 2019-03-01 NOTE — PROGRESS NOTES
"Eden is here for a 6-week postpartum checkup.    She had a  of a viable boy, weight 8 pounds 0 oz., with no complications. Date of delivery was 18. Since delivery, she has been breast feeding.  She has no signs of infection, bleeding or other complications.  She is not pregnant.  We discussed contraception options and she has chosen natural family planning.      Post partum tubal: No  History of Gestational Diabetes? No  Type of Delivery:  Vaginal  Feeding Method:  Breast  If initiated breast feeding and stopped, how long did you breast feed?:  n/a    REVIEW OF SYSTEMS:  Ears/Nose/Throat: negative  Respiratory: negative  Cardiovascular: negative  Gastrointestinal: negative  Genitourinary: negative  Musculoskeletal: negative    Neurologic: negative   Skin: negative   Endocrine:  negative  Psych:negative for postpartum depression      Past Medical History:   Diagnosis Date     NO ACTIVE PROBLEMS        Past Surgical History:   Procedure Laterality Date      SECTION N/A 2014    Procedure:  SECTION;  Surgeon: Raheem Batista MD;  Location:  L[facility]      SECTION N/A 2016    Procedure:  SECTION;  Surgeon: Raheem Batista MD;  Location: [facility]      SECTION N/A 2018    Procedure:  SECTION;  Repeat  Section;  Surgeon: Raheem Batista MD;  Location: [facility]       Family History   Problem Relation Age of Onset     Deep Vein Thrombosis Mother      Depression Father         anxiety and depression     Substance Abuse Father         alcohol     Deep Vein Thrombosis (DVT) Sister      Deep Vein Thrombosis (DVT) Maternal Grandfather      Other Cancer Paternal Grandmother         lung - she was a smoker           EXAM:  BP 98/62   Pulse 90   Temp 97.1  F (36.2  C) (Temporal)   Resp 14   Ht 1.651 m (5' 5\")   Wt 65.5 kg (144 lb 6.4 oz)   LMP  (LMP Unknown)   SpO2 100%   Breastfeeding? Yes   BMI 24.03 kg/m  "   HEENT: grossly normal.  NECK: no lymphadenopathy or thyroidomegaly.  LUNGS: CTA X 2, no rales or crackles.  BACK: No spinal or CVA tenderness.  HEART: RRR without murmurs clicks or gallops.  ABDOMEN: soft, non tender, good bowel sounds, without masses rebound, guarding or tenderness.  INCISION: n/a  PELVIC:  Deferred at her request.     EXTREMITIES:  warm to touch, good pulses, no ankle edema or calf tenderness.  NEUROLOGIC: grossly normal.    ASSESSMENT:   6-week postpartum exam after .    PLAN:    We discussed normal pp depression and anxiety that comes with parenting, and the abnormal or pathologic depressive symptoms. She has no concerns.    We talked at length about vaccines, she brought me some information she would like me to review and to try to learn as much about the safety of vaccines as possible. She is due for a pap smear, will make an appt later for that without her children.   Miryam Perez MD

## 2019-03-07 PROBLEM — Z34.83 ENCOUNTER FOR SUPERVISION OF OTHER NORMAL PREGNANCY IN THIRD TRIMESTER: Status: RESOLVED | Noted: 2018-06-16 | Resolved: 2019-03-07

## 2019-03-07 PROBLEM — Z98.891 PREVIOUS CESAREAN SECTION: Status: RESOLVED | Noted: 2018-02-19 | Resolved: 2019-03-07

## 2019-03-07 PROBLEM — Z98.891 S/P CESAREAN SECTION: Status: RESOLVED | Noted: 2018-08-20 | Resolved: 2019-03-07

## 2020-03-10 ENCOUNTER — HEALTH MAINTENANCE LETTER (OUTPATIENT)
Age: 30
End: 2020-03-10

## 2020-06-17 ENCOUNTER — VIRTUAL VISIT (OUTPATIENT)
Dept: FAMILY MEDICINE | Facility: CLINIC | Age: 30
End: 2020-06-17

## 2020-06-17 DIAGNOSIS — N92.0 MENORRHAGIA WITH REGULAR CYCLE: Primary | ICD-10-CM

## 2020-06-17 DIAGNOSIS — R10.2 PELVIC PAIN IN FEMALE: ICD-10-CM

## 2020-06-17 PROCEDURE — 99214 OFFICE O/P EST MOD 30 MIN: CPT | Mod: 95 | Performed by: FAMILY MEDICINE

## 2020-06-17 NOTE — PROGRESS NOTES
"Eden Yanez is a 29 year old female who is being evaluated via a billable video visit.    Video visit performed in lieu of an in-person visit due to current concerns regarding the current COVID-19 situation including social distancing and keeping at risk people safe.  Patient agreed to proceed with this visit due to these circumstances.       The patient has been notified of following:     \"This video visit will be conducted via a call between you and your physician/provider. We have found that certain health care needs can be provided without the need for an in-person physical exam.  This service lets us provide the care you need with a video conversation.  If a prescription is necessary we can send it directly to your pharmacy.  If lab work is needed we can place an order for that and you can then stop by our lab to have the test done at a later time.    Video visits are billed at different rates depending on your insurance coverage.  Please reach out to your insurance provider with any questions.    If during the course of the call the physician/provider feels a video visit is not appropriate, you will not be charged for this service.\"    Patient has given verbal consent for Video visit? Yes    Will anyone else be joining your video visit? No    Subjective     Eden Yanez is a 29 year old female who presents today via video visit for the following health issues:    HPI  Concern - menstrual problems  Onset: 1 day    Description:   Started period yesterday and had very heavy bleeding with lightheadedness and episodes of anxiety. Uterine pain like being stabbed more towards the right side has occurred 4-5 times in the past year but did not occur yesterday with this cycle.    Intensity: moderate, severe    Progression of Symptoms:  worsening    Accompanying Signs & Symptoms:      Previous history of similar problem:       Precipitating factors:   Worsened by:     Alleviating factors:  Improved by: "     Therapies Tried and outcome:   With drinking water helped.    She really has 2 separate concerns.  The first is that yesterday her period came on, and it is very heavy.  Normally her periods are monthly and regular, not heavy.  Yesterday it came on with a vengeance.  She was changing her tampon hourly and she felt lightheaded like she might faint.  She was drinking plenty of water.  She felt her heart pounding but it was not fast.  It just felt stronger.  She started to feel anxious.  This is unusual for her.  She had been having normal periods for about the past year since weaning her youngest son.   Now today she is much better.  She feels fine just exhausted.  Her bleeding is much less.    The other concern is the stabbing right lower quadrant pain.  This has happened about 4-5 times in the past year.  It did not happen yesterday with this cycle.  She is concerned because she has had prior C-sections and is wondering if there could be a problem with her incision.  It has not been related to any food intake or bowel activity.  She is not sure exactly when it happens during her monthly cycle but it is not when she is on her period.  She has had a slight amount of pain on the right lower side side ever since her last .  Just has not been that bad.  She wants to try to conceive again in the future.  She wants to make sure it is safe.    Video Start Time: 15:30    Patient Active Problem List   Diagnosis     Vomiting     Family history of DVT     Anemia due to blood loss, acute     Postpartum care and examination of lactating mother     Past Surgical History:   Procedure Laterality Date      SECTION N/A 2014    Procedure:  SECTION;  Surgeon: Raheem Batista MD;  Location: [facility]      SECTION N/A 2016    Procedure:  SECTION;  Surgeon: Raheem Batista MD;  Location: [facility]      SECTION N/A 2018    Procedure:  SECTION;   "Repeat  Section;  Surgeon: Raheem Batista MD;  Location:  L[facility]D       Social History     Tobacco Use     Smoking status: Never Smoker     Smokeless tobacco: Never Used   Substance Use Topics     Alcohol use: No     Alcohol/week: 0.0 standard drinks     Family History   Problem Relation Age of Onset     Deep Vein Thrombosis Mother      Depression Father         anxiety and depression     Substance Abuse Father         alcohol     Deep Vein Thrombosis (DVT) Sister      Deep Vein Thrombosis (DVT) Maternal Grandfather      Other Cancer Paternal Grandmother         lung - she was a smoker           Reviewed and updated as needed this visit by Provider  Tobacco  Allergies  Meds  Problems  Med Hx  Surg Hx  Fam Hx         Review of Systems   Constitutional, HEENT, cardiovascular, pulmonary, gi and gu systems are negative, except as otherwise noted.      Objective    There were no vitals taken for this visit.  Estimated body mass index is 24.03 kg/m  as calculated from the following:    Height as of 3/1/19: 1.651 m (5' 5\").    Weight as of 3/1/19: 65.5 kg (144 lb 6.4 oz).  Physical Exam     GENERAL: Healthy, alert and no distress  EYES: Eyes grossly normal to inspection.  No discharge or erythema, or obvious scleral/conjunctival abnormalities.  RESP: No audible wheeze, cough, or visible cyanosis.  No visible retractions or increased work of breathing.    SKIN: Visible skin clear. No significant rash, abnormal pigmentation or lesions.  NEURO: Cranial nerves grossly intact.  Mentation and speech appropriate for age.  PSYCH: Mentation appears normal, affect normal/bright, judgement and insight intact, normal speech and appearance well-groomed.      Assessment & Plan       ICD-10-CM    1. Menorrhagia with regular cycle  N92.0 TSH with free T4 reflex     CBC with platelets     US Pelvic Complete w Transvaginal   2. Pelvic pain in female  R10.2 CBC with platelets     US Pelvic Complete w Transvaginal "        We discussed how this could be a one-time occurrence, or could be the beginning of a change in her cycle.  She is not having the pain with this cycle, so we will monitor for recurrence of that.  I do not think this is related to any damage to her prior  scars.  We will set her up for a pelvic ultrasound to evaluate for ovarian cysts, check for any hematoma or other pathology around the lower uterine segment or other abnormalities.  Also would like to check her thyroid and CBC because of the heavier period.  But since this is really a one-time occurrence my concern is low.  She seems to be a lot better today than yesterday.  We talked about the risk for future childbearing.  I think it is low risk and she may attempt conception when she is ready.  With her last  there was no abnormality seen in the scar of the uterus.    See Patient Instructions    Return if symptoms worsen or fail to improve.    Miryam Perez MD  [facility]      Video-Visit Details    Type of service:  Video Visit    Video End Time:3:57 PM    Originating Location (pt. Location): Home    Distant Location (provider location):  [facility]     Platform used for Video Visit: [employer]    Return if symptoms worsen or fail to improve.       Miryam Perez MD

## 2020-06-18 NOTE — PATIENT INSTRUCTIONS
Sarita, I put orders in for the blood work (thyroid and blood count) and the ultrasound.  You can do these at any point in the future.  When you do, I will get the results and contact you.  Until then, keep track of your periods and your symptoms.  Try to see if the pain is consistently at a certain time of your monthly cycle.    Until then, try to enjoy your trip this weekend and call me sooner if you have any concerns.

## 2020-06-22 DIAGNOSIS — R10.2 PELVIC PAIN IN FEMALE: ICD-10-CM

## 2020-06-22 DIAGNOSIS — N92.0 MENORRHAGIA WITH REGULAR CYCLE: ICD-10-CM

## 2020-06-22 LAB
ERYTHROCYTE [DISTWIDTH] IN BLOOD BY AUTOMATED COUNT: 12.1 % (ref 10–15)
HCT VFR BLD AUTO: 38.1 % (ref 35–47)
HGB BLD-MCNC: 12.6 G/DL (ref 11.7–15.7)
MCH RBC QN AUTO: 31.3 PG (ref 26.5–33)
MCHC RBC AUTO-ENTMCNC: 33.1 G/DL (ref 31.5–36.5)
MCV RBC AUTO: 95 FL (ref 78–100)
PLATELET # BLD AUTO: 176 10E9/L (ref 150–450)
RBC # BLD AUTO: 4.03 10E12/L (ref 3.8–5.2)
TSH SERPL DL<=0.005 MIU/L-ACNC: 2.59 MU/L (ref 0.4–4)
WBC # BLD AUTO: 6.2 10E9/L (ref 4–11)

## 2020-06-22 PROCEDURE — 85027 COMPLETE CBC AUTOMATED: CPT | Performed by: FAMILY MEDICINE

## 2020-06-22 PROCEDURE — 84443 ASSAY THYROID STIM HORMONE: CPT | Performed by: FAMILY MEDICINE

## 2020-06-22 PROCEDURE — 36415 COLL VENOUS BLD VENIPUNCTURE: CPT | Performed by: FAMILY MEDICINE

## 2020-06-24 NOTE — RESULT ENCOUNTER NOTE
Sarita, your thyroid and blood count are both normal. Hemoglobin is back to normal, so no concern for anemia.   Miryam Perez MD

## 2020-10-03 ENCOUNTER — NURSE TRIAGE (OUTPATIENT)
Dept: NURSING | Facility: CLINIC | Age: 30
End: 2020-10-03

## 2020-10-03 NOTE — TELEPHONE ENCOUNTER
"She has visible tremors of her hands. She will have someone bring her to the ER for evaluation of alcohol intake.  Francia Mckoy RN  [facility]      Reason for Disposition    Feeling very shaky (i.e., visible tremors of hands)    Additional Information    Negative: Coma (e.g., not moving, not talking, not responding to stimuli)    Negative: Difficult to awaken or acting confused (e.g., disoriented, slurred speech)    Negative: Seeing, hearing, or feeling things that are not there (i.e., visual, auditory, or tactile hallucinations)    Negative: Slow, shallow and weak breathing    Negative: Seizure    Negative: Violent behavior, or threatening to physically hurt or kill someone    Negative: Patient attempted suicide    Negative: Threatening suicide    Negative: Sounds like a life-threatening emergency to the triager    Negative: Recent significant injury, see that guideline (e.g., head, neck, chest, abdominal or extremity  injury)    Negative: Substance abuse or dependence: question or problem related to    Negative: Depression is main problem or symptom (e.g., feelings of sadness or hopelessness)    Negative: SEVERE abdominal pain    Negative: [1] Constant abdominal pain AND [2] present > 2 hours    Negative: Blood in bowel movements (e.g., black, tarry or red blood)  (Exception: Blood on surface of BM with constipation)    Negative: [1] Vomiting AND [2] contains red blood or black (\"coffee ground\") material  (Exception: few red streaks in vomit that only happened once)    Negative: [1] Vomiting or dry heaves AND [2] occurring frequently (i.e., vomiting > 4 times in last 4 hours)    Protocols used: ALCOHOL ABUSE AND [id number]      "

## 2020-12-20 ENCOUNTER — HEALTH MAINTENANCE LETTER (OUTPATIENT)
Age: 30
End: 2020-12-20

## 2021-01-18 ENCOUNTER — NURSE TRIAGE (OUTPATIENT)
Dept: FAMILY MEDICINE | Facility: CLINIC | Age: 31
End: 2021-01-18

## 2021-01-18 NOTE — TELEPHONE ENCOUNTER
Patient calling to speak with nurse to see what she can do for neck pain that started on Friday, she now has  Headache and every now and then she has tingling in her fingers.  She stated all the symptoms are low level pain but distracting.  Please call to advise

## 2021-01-18 NOTE — TELEPHONE ENCOUNTER
Patient returned call.  She states she initially had told the person she spoke with she didn't want an appointment when offered one but now states changed her mind and would like appointment.  States was to call back if changed mind.  Please call patient regarding getting her in to be seen today.  Thank you.  Vashti STOKES

## 2021-01-18 NOTE — TELEPHONE ENCOUNTER
Patient alert and oriented on phone, is not sure if should be seen today or wait until after chiropractic appointment tomorrow. No visual issues. Not on any medications Nurse offered to schedule appointment with PCP but PCP has no openings - patient decided to see chiropractor first unless worsens.     Nurse advised patient to call clinic if new symptoms develop or decides would like to be seen before chiropractor.  To ED via 911 if: chest pain, SOB, unable to touch chin to chest, fever occurs.    Alternate heat and ice, 15 minutes on 10 minutes off, 6 to 7 times per day. Ibuprofen or Tylenol for pain.     Routing to PCP to review.    Additional Information    Negative: Shock suspected (e.g., cold/pale/clammy skin, too weak to stand, low BP, rapid pulse)    Negative: Similar pain previously and it was from 'heart attack'    Negative: Similar pain previously from 'angina' and not relieved by nitroglycerin    Negative: Difficult to awaken or acting confused (e.g., disoriented, slurred speech)    Negative: Sounds like a life-threatening emergency to the triager    Negative: Followed an injury to neck (e.g., MVA, sports, impact or collision)    Negative: Chest pain    Negative: Lymph node in the neck is swollen or painful to the touch    Negative: Sore throat is the main symptom    Negative: Difficulty breathing or unusual sweating (e.g., sweating without exertion)    Negative: Chest pain lasting longer than 5 minutes    Negative: Stiff neck (can't touch chin to chest) and has headache    Negative: Stiff neck (can't touch chin to chest) and fever    Negative: Weakness of an arm or hand    Negative: Problems with bowel or bladder control    Negative: Patient sounds very sick or weak to the triager    Negative: SEVERE pain (e.g., excruciating, unable to do any normal activities)    Negative: Head is twisting to one side (or ask 'is it turning against your will?')    Negative: Fever > 103 F (39.4 C)    Negative: Fever >  "100.0 F (37.8 C) and IVDA (intravenous drug abuse)    Negative: Fever > 100.0 F (37.8 C) and has diabetes mellitus or a weak immune system (e.g., HIV positive, cancer chemotherapy, organ transplant, splenectomy, chronic steroids)    Negative: Painful rash with multiple small blisters grouped together (i.e., dermatomal distribution or 'band' or 'stripe')    Negative: High-risk adult (e.g., history of cancer, HIV, or IV drug abuse)    Negative: Numbness in an arm or hand (i.e., loss of sensation)    Negative: Tenderness in front of neck over windpipe    Negative: Patient wants to be seen    Answer Assessment - Initial Assessment Questions  1. ONSET: \"When did the pain begin?\"       Neck pain started on Friday, carries stress/tension in upper back, Saturday or Sunday headache appeared, tried ibuprofen without relief  2. LOCATION: \"Where does it hurt?\"       Starts upper back base of neck, spot on right with a knot and pain goes up into head, tension headache across forehead, feeling a little nauseated. Does tend to get migraines around period.   3. PATTERN \"Does the pain come and go, or has it been constant since it started?\"       Constant sometimes a spike of pain that does not last long, spike resolves after moves but continues with the low level pain stays  4. SEVERITY: \"How bad is the pain?\"  (Scale 1-10; or mild, moderate, severe)    - MILD (1-3): doesn't interfere with normal activities     - MODERATE (4-7): interferes with normal activities or awakens from sleep     - SEVERE (8-10):  excruciating pain, unable to do any normal activities       3/10 some tingling pins and needles   5. RADIATION: \"Does the pain go anywhere else, shoot into your arms?\"      Radiates to head, sometimes feels like going to sleep down arms and down back and right arm.   6. CORD SYMPTOMS: \"Any weakness or numbness of the arms or legs?\"      Not weakness, has energy feels normal otherwise  7. CAUSE: \"What do you think is causing the " "neck pain?\"      Tried to have  rub out the knots, slept wrong, no injury  8. NECK OVERUSE: \"Any recent activities that involved turning or twisting the neck?\"      On phone/computer more now with a new job.   9. OTHER SYMPTOMS: \"Do you have any other symptoms?\" (e.g., headache, fever, chest pain, difficulty breathing, neck swelling)      Headache,   10. PREGNANCY: \"Is there any chance you are pregnant?\" \"When was your last menstrual period?\"        LMP ended a few days ago around when this started.    Protocols used: NECK PAIN OR [id number]                Jyoti Beauchamp RN  Triage Nurse  [facility], 24 hour nurse line, available by calling clinic at [phone] and following prompts.            "

## 2021-01-20 ENCOUNTER — NURSE TRIAGE (OUTPATIENT)
Dept: FAMILY MEDICINE | Facility: CLINIC | Age: 31
End: 2021-01-20

## 2021-01-20 NOTE — TELEPHONE ENCOUNTER
"Patient is calling and states she has a scheduled office visit tomorrow to have assessment of symptoms she has been experiencing with a pain in her shoulder and some tingling in her hands.  She stated she had scheduled a chiropractor appointment the other day, but cancelled because she started to feel better by the middle of the day.  She states she has been feeling a shoulder pain in the right shoulder, waking to tingling in her fingers, intermittent light headedness.  She stated she is calling at this time because today she had the symptoms of tingling in her hands and light headedness return this morning and this afternoon when she was calling the clinic, she was feeling \"clammy\" and cold.  She verbalized after drinking fluids, getting a blanket and waiting on the phone, she is now feeling better.  She states she is calling to see if she should go to [facility] or to wait for her appointment scheduled for tomorrow 1/21/2021.  Patient states she has a history of low BP.  Advised patient of home care remedies per protocol.  Advised patient to seek emergency care for worsening symptoms.  Advised patient to call with any further questions or concerns.  Patient stated understanding.    Additional Information    Negative: Shock suspected (e.g., cold/pale/clammy skin, too weak to stand, low BP, rapid pulse)    Negative: Difficult to awaken or acting confused (e.g., disoriented, slurred speech)    Negative: Fainted, and still feels dizzy afterwards    Negative: Severe difficulty breathing (e.g., struggling for each breath, speaks in single words)    Negative: Overdose (accidental or intentional) of medications    Negative: New neurologic deficit that is present now: * Weakness of the face, arm, or leg on one side of the body * Numbness of the face, arm, or leg on one side of the body * Loss of speech or garbled speech    Negative: Heart beating < 50 beats per minute OR > 140 beats per minute    Negative: Sounds like a " life-threatening emergency to the triager    Negative: Chest pain    Negative: Rectal bleeding, bloody stool, or tarry-black stool    Negative: Vomiting is the main symptom    Negative: Diarrhea is the main symptom    Negative: Headache is the main symptom    Negative: Heat exhaustion suspected (i.e., dehydration from heat exposure)    Negative: Patient states that he/she is having an anxiety/panic attack    Negative: SEVERE dizziness (e.g., unable to stand, requires support to walk, feels like passing out now)    Negative: SEVERE headache or neck pain    Negative: Spinning or tilting sensation (vertigo) present now and one or more stroke risk factors (i.e., hypertension, diabetes, prior stroke/TIA, heart attack, age over 60) (Exception: prior physician evaluation for this AND no different/worse than usual)    Negative: Loss of vision or double vision    Negative: Extra heart beats OR irregular heart beating (i.e., 'palpitations')    Negative: Difficulty breathing    Negative: Drinking very little and has signs of dehydration (e.g., no urine > 12 hours, very dry mouth, very lightheaded)    Negative: Follows bleeding (e.g., stomach, rectum, vagina) (Exception: became dizzy from sight of small amount blood)    Negative: Patient sounds very sick or weak to the triager    Negative: Lightheadedness (dizziness) present now, after 2 hours of rest and fluids    Negative: Spinning or tilting sensation (vertigo) present now    Negative: Fever > 103 F (39.4 C)    Negative: Fever > 100.0 F (37.8 C) and has diabetes mellitus or a weak immune system (e.g., HIV positive, cancer chemotherapy, organ transplant, splenectomy, chronic steroids)    Negative: Vomiting occurs with dizziness    Negative: Patient wants to be seen    Negative: Taking a medicine that could cause dizziness (e.g., blood pressure medications, diuretics)    Negative: Diabetes    Negative: Dizziness not present now, but is a chronic symptom (recurrent or ongoing  "AND lasting > 4 weeks)    Answer Assessment - Initial Assessment Questions  1. DESCRIPTION: \"Describe your dizziness.\"      Lightheaded     2. LIGHTHEADED: \"Do you feel lightheaded?\" (e.g., somewhat faint, woozy, weak upon standing)    Somewhat faint, woozy    3. VERTIGO: \"Do you feel like either you or the room is spinning or tilting?\" (i.e. vertigo)      No    4. SEVERITY: \"How bad is it?\"  \"Do you feel like you are going to faint?\" \"Can you stand and walk?\"    - MILD - walking normally    - MODERATE - interferes with normal activities (e.g., work, school)     - SEVERE - unable to stand, requires support to walk, feels like passing out now.       Yes, sometimes.      5. ONSET:  \"When did the dizziness begin?\"      Lightheaded, on and off for approximately 3-4 days    6. AGGRAVATING FACTORS: \"Does anything make it worse?\" (e.g., standing, change in head position)      Unknown, seems more internal    7. HEART RATE: \"Can you tell me your heart rate?\" \"How many beats in 15 seconds?\"  (Note: not all patients can do this)        Feels like its pounding out of my chest  (HR 68)    8. CAUSE: \"What do you think is causing the dizziness?\"      Unknown    9. RECURRENT SYMPTOM: \"Have you had dizziness before?\" If so, ask: \"When was the last time?\" \"What happened that time?\"      Have had history of anxiety    10. OTHER SYMPTOMS: \"Do you have any other symptoms?\" (e.g., fever, chest pain, vomiting, diarrhea, bleeding)        Chills, shivers    11. PREGNANCY: \"Is there any chance you are pregnant?\" \"When was your last menstrual period?\"        Unknown    Protocols used: DIZZINESS-A-OH  Sinai Nagy RN      "

## 2021-01-20 NOTE — TELEPHONE ENCOUNTER
Patient called stating that she has symptoms of headache, neck and back pain that have returned. Patient had called regarding these symptoms on 1/18/21 and had appt scheduled but canceled due to feeling better but now this morning returned.    Patient states that the headache is gone now but still has the tingling in the fingers.    Warm transferred to scheduling to be evaluated.     Kareem HORTA RN, BSN

## 2021-01-21 ENCOUNTER — OFFICE VISIT (OUTPATIENT)
Dept: FAMILY MEDICINE | Facility: CLINIC | Age: 31
End: 2021-01-21

## 2021-01-21 ENCOUNTER — TELEPHONE (OUTPATIENT)
Dept: FAMILY MEDICINE | Facility: CLINIC | Age: 31
End: 2021-01-21

## 2021-01-21 VITALS
HEART RATE: 90 BPM | SYSTOLIC BLOOD PRESSURE: 126 MMHG | BODY MASS INDEX: 23.32 KG/M2 | WEIGHT: 140 LBS | TEMPERATURE: 97.6 F | DIASTOLIC BLOOD PRESSURE: 80 MMHG | HEIGHT: 65 IN | RESPIRATION RATE: 16 BRPM

## 2021-01-21 DIAGNOSIS — G43.109 MIGRAINE WITH AURA AND WITHOUT STATUS MIGRAINOSUS, NOT INTRACTABLE: Primary | ICD-10-CM

## 2021-01-21 DIAGNOSIS — R20.2 PARESTHESIAS: ICD-10-CM

## 2021-01-21 DIAGNOSIS — R42 LIGHTHEADEDNESS: ICD-10-CM

## 2021-01-21 LAB
ALBUMIN SERPL-MCNC: 4.1 G/DL (ref 3.4–5)
ALP SERPL-CCNC: 37 U/L (ref 40–150)
ALT SERPL W P-5'-P-CCNC: 12 U/L (ref 0–50)
ANION GAP SERPL CALCULATED.3IONS-SCNC: 5 MMOL/L (ref 3–14)
AST SERPL W P-5'-P-CCNC: 8 U/L (ref 0–45)
BILIRUB SERPL-MCNC: 0.8 MG/DL (ref 0.2–1.3)
BUN SERPL-MCNC: 13 MG/DL (ref 7–30)
CALCIUM SERPL-MCNC: 9 MG/DL (ref 8.5–10.1)
CHLORIDE SERPL-SCNC: 105 MMOL/L (ref 94–109)
CO2 SERPL-SCNC: 28 MMOL/L (ref 20–32)
CREAT SERPL-MCNC: 0.75 MG/DL (ref 0.52–1.04)
DEPRECATED CALCIDIOL+CALCIFEROL SERPL-MC: 63 UG/L (ref 20–75)
ERYTHROCYTE [DISTWIDTH] IN BLOOD BY AUTOMATED COUNT: 11.6 % (ref 10–15)
GFR SERPL CREATININE-BSD FRML MDRD: >90 ML/MIN/{1.73_M2}
GLUCOSE SERPL-MCNC: 89 MG/DL (ref 70–99)
HCT VFR BLD AUTO: 41.6 % (ref 35–47)
HGB BLD-MCNC: 13.8 G/DL (ref 11.7–15.7)
MAGNESIUM SERPL-MCNC: 2.3 MG/DL (ref 1.6–2.3)
MCH RBC QN AUTO: 31.2 PG (ref 26.5–33)
MCHC RBC AUTO-ENTMCNC: 33.2 G/DL (ref 31.5–36.5)
MCV RBC AUTO: 94 FL (ref 78–100)
PLATELET # BLD AUTO: 180 10E9/L (ref 150–450)
POTASSIUM SERPL-SCNC: 3.8 MMOL/L (ref 3.4–5.3)
PROT SERPL-MCNC: 8 G/DL (ref 6.8–8.8)
RBC # BLD AUTO: 4.43 10E12/L (ref 3.8–5.2)
SODIUM SERPL-SCNC: 138 MMOL/L (ref 133–144)
TSH SERPL DL<=0.005 MIU/L-ACNC: 2.43 MU/L (ref 0.4–4)
VIT B12 SERPL-MCNC: 513 PG/ML (ref 193–986)
WBC # BLD AUTO: 4.9 10E9/L (ref 4–11)

## 2021-01-21 PROCEDURE — 84443 ASSAY THYROID STIM HORMONE: CPT | Performed by: PHYSICIAN ASSISTANT

## 2021-01-21 PROCEDURE — 83735 ASSAY OF MAGNESIUM: CPT | Performed by: PHYSICIAN ASSISTANT

## 2021-01-21 PROCEDURE — 36415 COLL VENOUS BLD VENIPUNCTURE: CPT | Performed by: PHYSICIAN ASSISTANT

## 2021-01-21 PROCEDURE — 82607 VITAMIN B-12: CPT | Performed by: PHYSICIAN ASSISTANT

## 2021-01-21 PROCEDURE — 85027 COMPLETE CBC AUTOMATED: CPT | Performed by: PHYSICIAN ASSISTANT

## 2021-01-21 PROCEDURE — 80053 COMPREHEN METABOLIC PANEL: CPT | Performed by: PHYSICIAN ASSISTANT

## 2021-01-21 PROCEDURE — 82306 VITAMIN D 25 HYDROXY: CPT | Performed by: PHYSICIAN ASSISTANT

## 2021-01-21 PROCEDURE — 99214 OFFICE O/P EST MOD 30 MIN: CPT | Performed by: PHYSICIAN ASSISTANT

## 2021-01-21 ASSESSMENT — ANXIETY QUESTIONNAIRES
5. BEING SO RESTLESS THAT IT IS HARD TO SIT STILL: SEVERAL DAYS
7. FEELING AFRAID AS IF SOMETHING AWFUL MIGHT HAPPEN: NEARLY EVERY DAY
1. FEELING NERVOUS, ANXIOUS, OR ON EDGE: MORE THAN HALF THE DAYS
2. NOT BEING ABLE TO STOP OR CONTROL WORRYING: MORE THAN HALF THE DAYS
GAD7 TOTAL SCORE: 14
3. WORRYING TOO MUCH ABOUT DIFFERENT THINGS: NEARLY EVERY DAY
6. BECOMING EASILY ANNOYED OR IRRITABLE: MORE THAN HALF THE DAYS
IF YOU CHECKED OFF ANY PROBLEMS ON THIS QUESTIONNAIRE, HOW DIFFICULT HAVE THESE PROBLEMS MADE IT FOR YOU TO DO YOUR WORK, TAKE CARE OF THINGS AT HOME, OR GET ALONG WITH OTHER PEOPLE: VERY DIFFICULT

## 2021-01-21 ASSESSMENT — PATIENT HEALTH QUESTIONNAIRE - PHQ9
SUM OF ALL RESPONSES TO PHQ QUESTIONS 1-9: 0
5. POOR APPETITE OR OVEREATING: SEVERAL DAYS

## 2021-01-21 ASSESSMENT — MIFFLIN-ST. JEOR: SCORE: 1350.92

## 2021-01-21 NOTE — PROGRESS NOTES
Adiel Stein is a 30 year old who presents to clinic today for the following health issues  HPI       Headache and neck pain- Sunday and Monday, was using ice and heat, headache and neck pain went away. Monday developed tingling sensation and shooting pains throughout the body randomly. Yesterday started feeling lightheadedness and nausea, sweating clammy, shaky and cold. Feeling very anxious and emotional.    Patient reports she tends to carry tension in her neck and shoulders. On Friday she noticed pain on the right side of her neck. Didn't think too much of this at first. On Saturday or Sunday she reports the headache started. Tried tylenol and ibuprofen without any relief. She also reports having some tingling pins and needles all over her body as well as ringing sensation in her ears. She reports on Tuesday she started to feel better so cancelled her appointment. Wednesday symptoms started to recur again. She reports she intermittently will feel light headed. She reports feeling clammy and cold at times as well. She also states feeling so much more emotional this week and not sure what is triggering this. She reports she is used to being with her 3 kids and constant chaos and loud noises. This week just feels on-edge and more irritable. She denies any vision changes. No chest pain or shortness of breath. Period just ended and this was otherwise normal. Denies any history of similar symptoms.     Review of Systems   Constitutional, HEENT, cardiovascular, pulmonary, GI, , musculoskeletal, neuro, skin, endocrine and psych systems are negative, except as otherwise noted.      Objective    There were no vitals taken for this visit.  There is no height or weight on file to calculate BMI.  Physical Exam   GENERAL: healthy, alert and no distress  EYES: Eyes grossly normal to inspection, PERRL and conjunctivae and sclerae normal  HENT: ear canals and TM's normal, nose and mouth without ulcers or lesions  NECK:  no adenopathy, no asymmetry, masses, or scars and thyroid normal to palpation  RESP: lungs clear to auscultation - no rales, rhonchi or wheezes  CV: regular rate and rhythm, normal S1 S2, no S3 or S4, no murmur, click or rub, no peripheral edema and peripheral pulses strong  ABDOMEN: soft, nontender, no hepatosplenomegaly, no masses and bowel sounds normal  MS: no gross musculoskeletal defects noted, no edema  SKIN: no suspicious lesions or rashes  NEURO: Normal strength and tone, sensory exam grossly normal, proprioception normal, mentation intact, speech normal and cranial nerves 2-12 intact  PSYCH: mentation appears normal, affect normal/bright and tearful        Assessment & Plan     Migraine with aura and without status migrainosus, not intractable  Unclear etiology of symptoms at this time. Patient has several symptoms that likely cannot be all related to neck strain/tension. She denies any acute stressors or recent injury. Period was normal. Will obtain labs as below. Discussed with patient that if these are normal we may want to consider further imaging. Encouraged supportive cares with rest, fluids, gentle stretching of neck and ice/heat as needed. Reviewed red flag symptoms that should prompt immediate care in the ER. Close follow-up pending results and progression of symptoms.   - CBC with platelets  - Comprehensive metabolic panel (BMP + Alb, Alk Phos, ALT, AST, Total. Bili, TP)  - TSH with free T4 reflex  - Vitamin B12  - Magnesium  - CT Head w/o Contrast; Future  - Vitamin D Deficiency    Lightheadedness  - CBC with platelets  - Comprehensive metabolic panel (BMP + Alb, Alk Phos, ALT, AST, Total. Bili, TP)  - TSH with free T4 reflex  - Vitamin B12  - Magnesium  - CT Head w/o Contrast; Future  - Vitamin D Deficiency    Paresthesias  - TSH with free T4 reflex  - Vitamin B12  - Magnesium    The patient indicates understanding of these issues and agrees with the plan.    [facility]  [facility]

## 2021-01-21 NOTE — TELEPHONE ENCOUNTER
Left message with patient to call back clinic with number given and to reschedule appointment with another provider that can see these issues. Malissa Ferro CMA ([facility])

## 2021-01-22 ENCOUNTER — HOSPITAL ENCOUNTER (OUTPATIENT)
Dept: CT IMAGING | Facility: CLINIC | Age: 31
Discharge: HOME | End: 2021-01-22
Attending: PHYSICIAN ASSISTANT | Admitting: PHYSICIAN ASSISTANT

## 2021-01-22 DIAGNOSIS — R42 LIGHTHEADEDNESS: ICD-10-CM

## 2021-01-22 DIAGNOSIS — G43.109 MIGRAINE WITH AURA AND WITHOUT STATUS MIGRAINOSUS, NOT INTRACTABLE: ICD-10-CM

## 2021-01-22 PROCEDURE — 70450 CT HEAD/BRAIN W/O DYE: CPT

## 2021-01-22 ASSESSMENT — ANXIETY QUESTIONNAIRES: GAD7 TOTAL SCORE: 14

## 2021-02-04 ENCOUNTER — TELEPHONE (OUTPATIENT)
Dept: FAMILY MEDICINE | Facility: CLINIC | Age: 31
End: 2021-02-04

## 2021-02-04 NOTE — TELEPHONE ENCOUNTER
Patient called to report thinks is having an allergic reaction to Cinnamon - face is read and swelling - nurse asked name and  to pull up chart and patient stated I think I want someone to come here I will just call 911 nurse advised yes that would be best but patient had already ended the call.    Nurse waited about 5 minutes and attempted to call patient back to be sure was able to dial 911.  All circuits busy at this time please try your call later.   Tried again - went to voice mail left message to send my chart or call clinic.    Called [place] non emergent number [phone] to see if patient was able to dial 911 was transferred to [facility] office dispatch who verified ambulance at patient's residence now.                   Jyoti Beauchamp RN  Triage Nurse  [facility], 24 hour nurse line, available by calling clinic at [phone] and following prompts.

## 2021-02-08 ENCOUNTER — HOSPITAL ENCOUNTER (EMERGENCY)
Facility: CLINIC | Age: 31
Discharge: HOME | End: 2021-02-09
Attending: FAMILY MEDICINE | Admitting: FAMILY MEDICINE

## 2021-02-08 DIAGNOSIS — F41.0 PANIC ATTACK: ICD-10-CM

## 2021-02-08 DIAGNOSIS — R00.2 PALPITATIONS: ICD-10-CM

## 2021-02-08 PROCEDURE — 99284 EMERGENCY DEPT VISIT MOD MDM: CPT | Mod: 25 | Performed by: FAMILY MEDICINE

## 2021-02-08 PROCEDURE — 99284 EMERGENCY DEPT VISIT MOD MDM: CPT | Performed by: FAMILY MEDICINE

## 2021-02-08 PROCEDURE — 93005 ELECTROCARDIOGRAM TRACING: CPT | Performed by: FAMILY MEDICINE

## 2021-02-08 PROCEDURE — 93010 ELECTROCARDIOGRAM REPORT: CPT | Performed by: FAMILY MEDICINE

## 2021-02-09 VITALS
TEMPERATURE: 96.8 F | DIASTOLIC BLOOD PRESSURE: 69 MMHG | OXYGEN SATURATION: 98 % | HEART RATE: 60 BPM | WEIGHT: 140 LBS | SYSTOLIC BLOOD PRESSURE: 110 MMHG | RESPIRATION RATE: 14 BRPM | BODY MASS INDEX: 23.3 KG/M2

## 2021-02-09 LAB
ANION GAP SERPL CALCULATED.3IONS-SCNC: 5 MMOL/L (ref 3–14)
BASOPHILS # BLD AUTO: 0 10E9/L (ref 0–0.2)
BASOPHILS NFR BLD AUTO: 0.5 %
BUN SERPL-MCNC: 12 MG/DL (ref 7–30)
CALCIUM SERPL-MCNC: 8.8 MG/DL (ref 8.5–10.1)
CHLORIDE SERPL-SCNC: 110 MMOL/L (ref 94–109)
CO2 SERPL-SCNC: 26 MMOL/L (ref 20–32)
CREAT SERPL-MCNC: 0.72 MG/DL (ref 0.52–1.04)
CRP SERPL-MCNC: <2.9 MG/L (ref 0–8)
D DIMER PPP FEU-MCNC: 0.4 UG/ML FEU (ref 0–0.5)
DIFFERENTIAL METHOD BLD: NORMAL
EOSINOPHIL # BLD AUTO: 0.1 10E9/L (ref 0–0.7)
EOSINOPHIL NFR BLD AUTO: 1.3 %
ERYTHROCYTE [DISTWIDTH] IN BLOOD BY AUTOMATED COUNT: 11.9 % (ref 10–15)
GFR SERPL CREATININE-BSD FRML MDRD: >90 ML/MIN/{1.73_M2}
GLUCOSE SERPL-MCNC: 100 MG/DL (ref 70–99)
HCG UR QL: NEGATIVE
HCT VFR BLD AUTO: 37.2 % (ref 35–47)
HGB BLD-MCNC: 12.7 G/DL (ref 11.7–15.7)
LYMPHOCYTES # BLD AUTO: 2 10E9/L (ref 0.8–5.3)
LYMPHOCYTES NFR BLD AUTO: 32.5 %
MCH RBC QN AUTO: 31.3 PG (ref 26.5–33)
MCHC RBC AUTO-ENTMCNC: 34.1 G/DL (ref 31.5–36.5)
MCV RBC AUTO: 92 FL (ref 78–100)
MONOCYTES # BLD AUTO: 0.5 10E9/L (ref 0–1.3)
MONOCYTES NFR BLD AUTO: 7.4 %
NEUTROPHILS # BLD AUTO: 3.6 10E9/L (ref 1.6–8.3)
NEUTROPHILS NFR BLD AUTO: 58.3 %
PLATELET # BLD AUTO: 190 10E9/L (ref 150–450)
POTASSIUM SERPL-SCNC: 3.7 MMOL/L (ref 3.4–5.3)
RBC # BLD AUTO: 4.06 10E12/L (ref 3.8–5.2)
SODIUM SERPL-SCNC: 141 MMOL/L (ref 133–144)
TROPONIN I SERPL-MCNC: <0.015 UG/L (ref 0–0.04)
WBC # BLD AUTO: 6.1 10E9/L (ref 4–11)

## 2021-02-09 PROCEDURE — 85025 COMPLETE CBC W/AUTO DIFF WBC: CPT | Performed by: FAMILY MEDICINE

## 2021-02-09 PROCEDURE — 250N000013 HC RX MED GY IP 250 OP 250 PS 637: Performed by: FAMILY MEDICINE

## 2021-02-09 PROCEDURE — 86140 C-REACTIVE PROTEIN: CPT | Performed by: FAMILY MEDICINE

## 2021-02-09 PROCEDURE — 80048 BASIC METABOLIC PNL TOTAL CA: CPT | Performed by: FAMILY MEDICINE

## 2021-02-09 PROCEDURE — 81025 URINE PREGNANCY TEST: CPT | Performed by: FAMILY MEDICINE

## 2021-02-09 PROCEDURE — 85379 FIBRIN DEGRADATION QUANT: CPT | Performed by: FAMILY MEDICINE

## 2021-02-09 PROCEDURE — 84484 ASSAY OF TROPONIN QUANT: CPT | Performed by: FAMILY MEDICINE

## 2021-02-09 RX ORDER — ALPRAZOLAM 0.5 MG
0.5 TABLET ORAL 3 TIMES DAILY PRN
Qty: 10 TABLET | Refills: 0 | Status: SHIPPED | OUTPATIENT
Start: 2021-02-09 | End: 2021-02-11

## 2021-02-09 RX ORDER — ALPRAZOLAM 0.5 MG
0.5 TABLET ORAL ONCE
Status: COMPLETED | OUTPATIENT
Start: 2021-02-09 | End: 2021-02-09

## 2021-02-09 RX ADMIN — ALPRAZOLAM 0.5 MG: 0.5 TABLET ORAL at 01:20

## 2021-02-09 ASSESSMENT — ENCOUNTER SYMPTOMS
HEADACHES: 1
SEIZURES: 0
COUGH: 0
UNEXPECTED WEIGHT CHANGE: 0
NERVOUS/ANXIOUS: 1
CHILLS: 0
FATIGUE: 1
WEAKNESS: 0
APPETITE CHANGE: 0
SHORTNESS OF BREATH: 0
SPEECH DIFFICULTY: 0
DIAPHORESIS: 0
NUMBNESS: 1
HEMATOLOGIC/LYMPHATIC NEGATIVE: 1
TREMORS: 0
FEVER: 0
PALPITATIONS: 1
MUSCULOSKELETAL NEGATIVE: 1
EYES NEGATIVE: 1
POLYDIPSIA: 0
DIZZINESS: 0
POLYPHAGIA: 0

## 2021-02-09 NOTE — ED TRIAGE NOTES
Patient could not sleep because she felt like her heart was racing and her blood pressure was high. She does have a history of panic attacks for which she does not take any kind of medications.

## 2021-02-09 NOTE — ED PROVIDER NOTES
History     Chief Complaint   Patient presents with     Anxiety     HPI  Eden Yanez is a 30 year old female who presents to the ER with concerns about her heart feeling like it is racing.  She states that the symptoms started about 2 hours ago and continued until stopping on the way here to the ER.  Patient states that she has had similar bouts on and off for several months.  She has been seen in our clinic as well as a  is had blood test performed but no specific reason has been identified for her symptoms.  Patient admits to history for anxiety and panic attacks but states that they usually have not been anything like she is been having lately.  She states the these are much more intense to the point where she is tingling all over and feeling at band around her head and face at times.  She states that she does have a history for migraine but does not have any migraine type pain associated with these events.  Patient states that she has a heightened sense of everything when these events occur.  She is also concerned because she checked her blood pressure and it was higher than her typical tonight.  She states that the top number was 130 and she normally runs quite low.  He denies any new medications.  She denies any recent fall or injury.  She denies any infectious illness.  She does admit to occasional nausea.  Denies any bloody stools.  When asked if she could be pregnant she states that she took a pregnant test at home that was negative and she is currently on her menstrual cycle.  She denies any excessive bleeding from her menstrual cycle this time.  She denies any extremity edema or tenderness.  She denies abdominal pain currently.  She denies shortness of breath or chest pain associated with these palpitation episodes.  I asked if she checked her heart rate at the time of the episode and she states that she did check it with the same monitor that checks her blood pressure and it read  around 100 bpm.      I reviewed the following nurse triage note:  Patient could not sleep because she felt like her heart was racing and her blood pressure was high. She does have a history of panic attacks for which she does not take any kind of medications.      Allergies:  Allergies   Allergen Reactions     Cephalexin Rash     Cephalosporins Rash     This occurred on the third day of using cephalexin. Was not observed by MD but reported as a rash on abdomen and legs.       Problem List:    Patient Active Problem List    Diagnosis Date Noted     Anemia due to blood loss, acute 2018     Priority: Medium     Postpartum care and examination of lactating mother 2018     Priority: Medium     Family history of DVT 08/15/2018     Priority: Medium     Vomiting 2016     Priority: Medium        Past Medical History:    Past Medical History:   Diagnosis Date     NO ACTIVE PROBLEMS        Past Surgical History:    Past Surgical History:   Procedure Laterality Date      SECTION N/A 2014    Procedure:  SECTION;  Surgeon: Raheem Batista MD;  [facility]: [facility]      SECTION N/A 2016    Procedure:  SECTION;  Surgeon: Raheem Batista MD;  [facility]: [facility]      SECTION N/A 2018    Procedure:  SECTION;  Repeat  Section;  Surgeon: Raheem Batista MD;  Location: [facility]       Family History:    Family History   Problem Relation Age of Onset     Deep Vein Thrombosis Mother      Depression Father         anxiety and depression     Substance Abuse Father         alcohol     Deep Vein Thrombosis (DVT) Sister      Deep Vein Thrombosis (DVT) Maternal Grandfather      Other Cancer Paternal Grandmother         lung - she was a smoker       Social History:  Marital Status:   [2]  Social History     Tobacco Use     Smoking status: Never Smoker     Smokeless tobacco: Never Used   Substance Use Topics     Alcohol use: No      Alcohol/week: 0.0 standard drinks     Drug use: No        Medications:         ALPRAZolam (XANAX) 0.5 MG tablet          Review of Systems   Constitutional: Positive for fatigue. Negative for appetite change, chills, diaphoresis, fever and unexpected weight change.   HENT: Negative.    Eyes: Negative.    Respiratory: Negative for cough and shortness of breath.    Cardiovascular: Positive for palpitations. Negative for chest pain and leg swelling.   Endocrine: Negative for polydipsia, polyphagia and polyuria.   Genitourinary: Negative.    Musculoskeletal: Negative.    Skin: Negative.  Negative for rash (Patient stated that she called the ambulance a month ago or so because of the reaction to cinnamon which she felt like she had a flushing sensation similar to those she has had when she takes niacin.).   Neurological: Positive for numbness (States that she gets tingling into her fingers at times.) and headaches (She admits to a history of her migraines but does not currently have a headache.  She gets a fullness in her head when these episodes occur as well as a heightened sense of everything.). Negative for dizziness, tremors, seizures, syncope, speech difficulty and weakness.   Hematological: Negative.    Psychiatric/Behavioral: The patient is nervous/anxious.    All other systems reviewed and are negative.      Physical Exam   BP: 124/87  Pulse: 74  Temp: 96.8  F (36  C)  Resp: 14  Weight: 63.5 kg (140 lb)  SpO2: 99 %      Physical Exam  Vitals signs and nursing note reviewed. Exam conducted with a chaperone present ().   HENT:      Head: Normocephalic and atraumatic.   Eyes:      Extraocular Movements: Extraocular movements intact.      Conjunctiva/sclera: Conjunctivae normal.      Pupils: Pupils are equal, round, and reactive to light.   Neck:      Musculoskeletal: Normal range of motion and neck supple.   Cardiovascular:      Rate and Rhythm: Normal rate.      Pulses: Normal pulses.      Heart sounds:  Normal heart sounds. No murmur. No friction rub. No gallop.    Pulmonary:      Effort: Pulmonary effort is normal. No respiratory distress.      Breath sounds: No stridor. No wheezing, rhonchi or rales.   Abdominal:      General: Abdomen is flat.      Palpations: There is no mass.      Tenderness: There is no abdominal tenderness.   Musculoskeletal: Normal range of motion.         General: No swelling.      Right lower leg: No edema.      Left lower leg: No edema.   Skin:     General: Skin is warm.      Capillary Refill: Capillary refill takes less than 2 seconds.      Findings: No rash.   Neurological:      Mental Status: She is alert and oriented to person, place, and time.   Psychiatric:         Attention and Perception: Attention normal.         Mood and Affect: Mood is anxious.         Speech: Speech is rapid and pressured.         Behavior: Behavior is cooperative.         Thought Content: Thought content normal.         Cognition and Memory: Cognition and memory normal.         ED Course        Procedures               EKG Interpretation:      Interpreted by Reji Munguia DO  Time reviewed: 12:18 AM  Symptoms at time of EKG: Improved palpitations  Rhythm: normal sinus   Rate: normal  Axis: normal  Ectopy: none  Conduction: normal  ST Segments/ T Waves: No ST-T wave changes  Q Waves: none  Comparison to prior: No old EKG available    Clinical Impression: normal EKG      Critical Care time:  none            Results for orders placed or performed during the hospital encounter of 02/08/21 (from the past 24 hour(s))   Basic metabolic panel   Result Value Ref Range    Sodium 141 133 - 144 mmol/L    Potassium 3.7 3.4 - 5.3 mmol/L    Chloride 110 (H) 94 - 109 mmol/L    Carbon Dioxide 26 20 - 32 mmol/L    Anion Gap 5 3 - 14 mmol/L    Glucose 100 (H) 70 - 99 mg/dL    Urea Nitrogen 12 7 - 30 mg/dL    Creatinine 0.72 0.52 - 1.04 mg/dL    GFR Estimate >90 >60 mL/min/[1.73_m2]    GFR Estimate If Black >90 >60  mL/min/[1.73_m2]    Calcium 8.8 8.5 - 10.1 mg/dL   CBC with platelets differential   Result Value Ref Range    WBC 6.1 4.0 - 11.0 10e9/L    RBC Count 4.06 3.8 - 5.2 10e12/L    Hemoglobin 12.7 11.7 - 15.7 g/dL    Hematocrit 37.2 35.0 - 47.0 %    MCV 92 78 - 100 fl    MCH 31.3 26.5 - 33.0 pg    MCHC 34.1 31.5 - 36.5 g/dL    RDW 11.9 10.0 - 15.0 %    Platelet Count 190 150 - 450 10e9/L    Diff Method Automated Method     % Neutrophils 58.3 %    % Lymphocytes 32.5 %    % Monocytes 7.4 %    % Eosinophils 1.3 %    % Basophils 0.5 %    Absolute Neutrophil 3.6 1.6 - 8.3 10e9/L    Absolute Lymphocytes 2.0 0.8 - 5.3 10e9/L    Absolute Monocytes 0.5 0.0 - 1.3 10e9/L    Absolute Eosinophils 0.1 0.0 - 0.7 10e9/L    Absolute Basophils 0.0 0.0 - 0.2 10e9/L   D dimer quantitative   Result Value Ref Range    D Dimer 0.4 0.0 - 0.50 ug/ml FEU   Troponin I   Result Value Ref Range    Troponin I ES <0.015 0.000 - 0.045 ug/L   CRP inflammation   Result Value Ref Range    CRP Inflammation <2.9 0.0 - 8.0 mg/L   HCG qualitative urine   Result Value Ref Range    HCG Qual Urine Negative NEG^Negative       Medications   ALPRAZolam (XANAX) tablet 0.5 mg (0.5 mg Oral Given 2/9/21 0120)       Assessments & Plan (with Medical Decision Making)  30-year-old female to the ER secondary to concerns of palpitations, anxiety, elevated blood pressure, and feeling not her normal self for the last month or so.  Patient with exam findings consistent with likely panic attack without evidence for other reason for symptomatology.  Patient's had prior blood tests but additional testing today was also reassuring.  Patient's symptoms improved significantly especially after receiving a dose of oral alprazolam.  Patient is referred back to her clinic to discuss options for treatment of the anxiety as well as for referral for counseling services.  Patient was prescribed a few Xanax to have to use only as needed for acute panic attack episodes.  She was discharged in  the care of her .     I have reviewed the nursing notes.    I have reviewed the findings, diagnosis, plan and need for follow up with the patient.       New Prescriptions    ALPRAZOLAM (XANAX) 0.5 MG TABLET    Take 1 tablet (0.5 mg) by mouth 3 times daily as needed for anxiety            I verbally discussed the findings of the evaluation today in the ER. I have verbally discussed with Eden the suggested treatment(s) as described in the discharge instructions and handouts. I have prescribed the above listed medications and instructed her on appropriate use of these medications.      I have verbally suggested she follow-up in her clinic or return to the ER for increased symptoms. See the follow-up recommendations documented  in the after visit summary in this visit's EPIC chart.    Final diagnoses:   Panic attack       2/8/2021   [facility] EMERGENCY DEPT     Reji Munguia,   02/09/21 0153
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
## 2021-02-10 NOTE — PATIENT INSTRUCTIONS
[phone]      Preventive Health Recommendations  Female Ages 26 - 39  Yearly exam:   See your health care provider every year in order to    Review health changes.     Discuss preventive care.      Review your medicines if you your doctor has prescribed any.    Until age 30: Get a Pap test every three years (more often if you have had an abnormal result).    After age 30: Talk to your doctor about whether you should have a Pap test every 3 years or have a Pap test with HPV screening every 5 years.   You do not need a Pap test if your uterus was removed (hysterectomy) and you have not had cancer.  You should be tested each year for STDs (sexually transmitted diseases), if you're at risk.   Talk to your provider about how often to have your cholesterol checked.  If you are at risk for diabetes, you should have a diabetes test (fasting glucose).  Shots: Get a flu shot each year. Get a tetanus shot every 10 years.   Nutrition:     Eat at least 5 servings of fruits and vegetables each day.    Eat whole-grain bread, whole-wheat pasta and brown rice instead of white grains and rice.    Get adequate Calcium and Vitamin D.     Lifestyle    Exercise at least 150 minutes a week (30 minutes a day, 5 days of the week). This will help you control your weight and prevent disease.    Limit alcohol to one drink per day.    No smoking.     Wear sunscreen to prevent skin cancer.    See your dentist every six months for an exam and cleaning.

## 2021-02-11 ENCOUNTER — OFFICE VISIT (OUTPATIENT)
Dept: FAMILY MEDICINE | Facility: CLINIC | Age: 31
End: 2021-02-11

## 2021-02-11 VITALS
RESPIRATION RATE: 18 BRPM | DIASTOLIC BLOOD PRESSURE: 70 MMHG | TEMPERATURE: 97.1 F | OXYGEN SATURATION: 99 % | BODY MASS INDEX: 22.82 KG/M2 | HEIGHT: 65 IN | WEIGHT: 137 LBS | SYSTOLIC BLOOD PRESSURE: 104 MMHG | HEART RATE: 60 BPM

## 2021-02-11 DIAGNOSIS — R20.2 PARESTHESIAS: ICD-10-CM

## 2021-02-11 DIAGNOSIS — F43.22 ADJUSTMENT DISORDER WITH ANXIOUS MOOD: ICD-10-CM

## 2021-02-11 DIAGNOSIS — Z00.00 ROUTINE GENERAL MEDICAL EXAMINATION AT A HEALTH CARE FACILITY: Primary | ICD-10-CM

## 2021-02-11 LAB
T3 SERPL-MCNC: 118 NG/DL (ref 60–181)
T3FREE SERPL-MCNC: 2.8 PG/ML (ref 2.3–4.2)
T4 FREE SERPL-MCNC: 1.09 NG/DL (ref 0.76–1.46)
T4 SERPL-MCNC: 10.3 UG/DL (ref 4.5–13.9)

## 2021-02-11 PROCEDURE — 84481 FREE ASSAY (FT-3): CPT | Performed by: PHYSICIAN ASSISTANT

## 2021-02-11 PROCEDURE — 99000 SPECIMEN HANDLING OFFICE-LAB: CPT | Performed by: PHYSICIAN ASSISTANT

## 2021-02-11 PROCEDURE — 84432 ASSAY OF THYROGLOBULIN: CPT | Performed by: PHYSICIAN ASSISTANT

## 2021-02-11 PROCEDURE — 86800 THYROGLOBULIN ANTIBODY: CPT | Performed by: PHYSICIAN ASSISTANT

## 2021-02-11 PROCEDURE — 84480 ASSAY TRIIODOTHYRONINE (T3): CPT | Performed by: PHYSICIAN ASSISTANT

## 2021-02-11 PROCEDURE — 36415 COLL VENOUS BLD VENIPUNCTURE: CPT | Performed by: PHYSICIAN ASSISTANT

## 2021-02-11 PROCEDURE — 84436 ASSAY OF TOTAL THYROXINE: CPT | Performed by: PHYSICIAN ASSISTANT

## 2021-02-11 PROCEDURE — 84482 T3 REVERSE: CPT | Mod: 90 | Performed by: PHYSICIAN ASSISTANT

## 2021-02-11 PROCEDURE — 99213 OFFICE O/P EST LOW 20 MIN: CPT | Mod: 25 | Performed by: PHYSICIAN ASSISTANT

## 2021-02-11 PROCEDURE — 84439 ASSAY OF FREE THYROXINE: CPT | Performed by: PHYSICIAN ASSISTANT

## 2021-02-11 PROCEDURE — 99395 PREV VISIT EST AGE 18-39: CPT | Performed by: PHYSICIAN ASSISTANT

## 2021-02-11 RX ORDER — ALPRAZOLAM 0.5 MG
0.5 TABLET ORAL 3 TIMES DAILY PRN
Qty: 20 TABLET | Refills: 0 | Status: SHIPPED | OUTPATIENT
Start: 2021-02-11 | End: 2021-05-20

## 2021-02-11 ASSESSMENT — ENCOUNTER SYMPTOMS
JOINT SWELLING: 0
NAUSEA: 0
HEADACHES: 0
WEAKNESS: 0
SORE THROAT: 0
CHILLS: 0
EYE PAIN: 0
HEMATURIA: 0
FEVER: 0
COUGH: 0
HEARTBURN: 0
ABDOMINAL PAIN: 0
DYSURIA: 0
ARTHRALGIAS: 0
MYALGIAS: 0
SHORTNESS OF BREATH: 0
PARESTHESIAS: 0
BREAST MASS: 0
HEMATOCHEZIA: 0
DIZZINESS: 0
PALPITATIONS: 0
FREQUENCY: 0
DIARRHEA: 0
CONSTIPATION: 0
NERVOUS/ANXIOUS: 1

## 2021-02-11 ASSESSMENT — MIFFLIN-ST. JEOR: SCORE: 1341.27

## 2021-02-12 LAB
THYROGLOB AB SERPL IA-ACNC: <20 IU/ML (ref 0–40)
THYROGLOB SERPL-MCNC: 47.6 NG/ML

## 2021-02-16 LAB — T3REVERSE SERPL-MCNC: 16.9 NG/DL (ref 9–27)

## 2021-05-11 ENCOUNTER — NURSE TRIAGE (OUTPATIENT)
Dept: NURSING | Facility: CLINIC | Age: 31
End: 2021-05-11

## 2021-05-12 ENCOUNTER — OFFICE VISIT (OUTPATIENT)
Dept: FAMILY MEDICINE | Facility: OTHER | Age: 31
End: 2021-05-12

## 2021-05-12 ENCOUNTER — ANCILLARY PROCEDURE (OUTPATIENT)
Dept: GENERAL RADIOLOGY | Facility: OTHER | Age: 31
End: 2021-05-12
Attending: FAMILY MEDICINE

## 2021-05-12 ENCOUNTER — MYC MEDICAL ADVICE (OUTPATIENT)
Dept: FAMILY MEDICINE | Facility: OTHER | Age: 31
End: 2021-05-12

## 2021-05-12 VITALS
SYSTOLIC BLOOD PRESSURE: 90 MMHG | DIASTOLIC BLOOD PRESSURE: 60 MMHG | OXYGEN SATURATION: 98 % | WEIGHT: 130 LBS | BODY MASS INDEX: 21.66 KG/M2 | HEART RATE: 78 BPM | TEMPERATURE: 97.5 F | HEIGHT: 65 IN

## 2021-05-12 DIAGNOSIS — S99.922A TOE INJURY, LEFT, INITIAL ENCOUNTER: ICD-10-CM

## 2021-05-12 DIAGNOSIS — Z12.4 SCREENING FOR MALIGNANT NEOPLASM OF CERVIX: ICD-10-CM

## 2021-05-12 DIAGNOSIS — M79.675 PAIN OF TOE OF LEFT FOOT: ICD-10-CM

## 2021-05-12 DIAGNOSIS — S92.525A CLOSED NONDISPLACED FRACTURE OF MIDDLE PHALANX OF LESSER TOE OF LEFT FOOT, INITIAL ENCOUNTER: Primary | ICD-10-CM

## 2021-05-12 PROCEDURE — 99214 OFFICE O/P EST MOD 30 MIN: CPT | Performed by: FAMILY MEDICINE

## 2021-05-12 PROCEDURE — 73660 X-RAY EXAM OF TOE(S): CPT | Mod: LT | Performed by: RADIOLOGY

## 2021-05-12 ASSESSMENT — MIFFLIN-ST. JEOR: SCORE: 1301.81

## 2021-05-12 NOTE — TELEPHONE ENCOUNTER
Patient calling back reporting she injured her toe 24 hours ago. States she was advised to make an appointment with a provider but unable to find an appointment with any [facility] per schedulers. Reports her toe is swollen and appears bruised. Rates pain at 5/10. Informed patient RN will page on call provider for second level triage.     Paged on call provider MAUREEN FREDERICK MD for [facility] via [employer] web at 8:16pm to call RN directly at [phone].    Dr. Frederick called RN and advised patient to be seen within 24 hours. Advised patient to call clinic in the morning to see if she can be seen. Advised patient to ice her foot, elevate her leg, and take ibuprofen for pain.     RN called patient and informed of provider's recommendation. Caller verbalized understanding. Denies further questions.      Pranav Robertson RN  [facility]     COVID 19 Nurse Triage Plan/Patient Instructions    Please be aware that novel coronavirus (COVID-19) may be circulating in the community. If you develop symptoms such as fever, cough, or SOB or if you have concerns about the presence of another infection including coronavirus (COVID-19), please contact your health care provider or visit [email or web address].     Disposition/Instructions    In-Person Visit with provider recommended. Reference Visit Selection Guide.    Thank you for taking steps to prevent the spread of this virus.  o Limit your contact with others.  o Wear a simple mask to cover your cough.  o Wash your hands well and often.    Resources    [facility]: About COVID-19: [email or web address]    CDC: What to Do If You're Sick: www.cdc.gov/coronavirus/2019-ncov/about/steps-when-sick.html    CDC: Ending Home Isolation: www.cdc.gov/coronavirus/2019-ncov/hcp/disposition-in-home-patients.html     CDC: Caring for Someone: www.cdc.gov/coronavirus/2019-ncov/if-you-are-sick/care-for-someone.html      [facility]: Interim Guidance for Hospital Discharge to Home: www.health.[facility].mn.us/diseases/coronavirus/hcp/hospdischarge.pdf    [facility] clinical trials (COVID-19 research studies): clinicalaffairs.[facility]/umn-clinical-trials     Below are the COVID-19 hotlines at the [place] Department of Health ([facility]). Interpreters are available.   o For health questions: Call 581-778-9192 or 1-503.304.8131 (7 a.m. to 7 p.m.)  o For questions about schools and childcare: Call 324-822-8556 or 1-194.859.9932 (7 a.m. to 7 p.m.)                       Reason for Disposition    Looks like a dislocated joint (e.g., crooked or deformed)    Additional Information    Negative: [1] Major bleeding (e.g., spurting blood) AND [2] can't be stopped    Negative: Amputated toe    Negative: Skin is split open or gaping (or length > 1/2 inch or 12 mm)    Negative: [1] Bleeding AND [2] won't stop after 10 minutes of direct pressure (using correct technique)    Negative: [1] Dirt in the wound AND [2] not removed with 15 minutes of scrubbing    Negative: Sounds like a serious injury to the triager    Negative: [1] SEVERE pain AND [2] not improved 2 hours after pain medicine/ice packs    Negative: [1] MODERATE-SEVERE pain AND [2] blood present under the toenail    Negative: Looks like a broken bone (e.g., crooked or deformed)    Protocols used: TOE INJURY-A-

## 2021-05-12 NOTE — TELEPHONE ENCOUNTER
Patient thinks she broke her toe. Yesterday she was going quickly up the stairs and jammed the toe. The toe is swollen, bruised, sore, has a limp with walking, bruising is traveling up the foot. She was able to wear boots on the foot earlier today.  Advised per RN triage guidelines to see PCP within 2-3 days. Transferred to scheduling.     Bridget Francis RN/[facility]        COVID 19 Nurse Triage Plan/Patient Instructions    Please be aware that novel coronavirus (COVID-19) may be circulating in the community. If you develop symptoms such as fever, cough, or SOB or if you have concerns about the presence of another infection including coronavirus (COVID-19), please contact your health care provider or visit [email or web address].     Disposition/Instructions    In-Person Visit with provider recommended. Reference Visit Selection Guide.    Thank you for taking steps to prevent the spread of this virus.  o Limit your contact with others.  o Wear a simple mask to cover your cough.  o Wash your hands well and often.    Resources    [facility]: About COVID-19: [email or web address]    CDC: What to Do If You're Sick: www.cdc.gov/coronavirus/2019-ncov/about/steps-when-sick.html    CDC: Ending Home Isolation: www.cdc.gov/coronavirus/2019-ncov/hcp/disposition-in-home-patients.html     CDC: Caring for Someone: www.cdc.gov/coronavirus/2019-ncov/if-you-are-sick/care-for-someone.html     [facility]: Interim Guidance for Hospital Discharge to Home: www.health.[facility].mn.us/diseases/coronavirus/hcp/hospdischarge.pdf    [facility] clinical trials (COVID-19 research studies): clinicalaffairs.[facility].edu/umn-clinical-trials     Below are the COVID-19 hotlines at the [place] Department of Health ([facility]). Interpreters are available.   o For health questions: Call 579-683-7963 or 1-923.666.5268 (7 a.m. to 7 p.m.)  o For questions about schools and childcare: Call 688-142-0178 or  3-902-620-7157 (7 a.m. to 7 p.m.)     Reason for Disposition    Large swelling or bruise    Additional Information    Negative: [1] Major bleeding (e.g., spurting blood) AND [2] can't be stopped    Negative: Foot or ankle injury    Negative: Looks infected    Negative: Amputated toe    Negative: Skin is split open or gaping (or length > 1/2 inch or 12 mm)    Negative: [1] Bleeding AND [2] won't stop after 10 minutes of direct pressure (using correct technique)    Negative: [1] Dirt in the wound AND [2] not removed with 15 minutes of scrubbing    Negative: Sounds like a serious injury to the triager    Negative: Looks like a broken bone (e.g., crooked or deformed)    Negative: Looks like a dislocated joint (e.g., crooked or deformed)    Negative: [1] MODERATE-SEVERE pain AND [2] blood present under the toenail    Negative: Toenail is completely torn off (toenail avulsion)    Negative: Base of toenail has popped out of the skin fold (nail base dislocation)    Negative: [1] SEVERE pain AND [2] not improved 2 hours after pain medicine/ice packs    Negative: [1] No prior tetanus shots (or is not fully vaccinated) AND [2] any wound (e.g., cut, scrape)    Negative: [1] HIV positive or severe immunodeficiency (severely weak immune system) AND [2] DIRTY cut or scrape    Negative: [1] Toe injury AND [2] bad limp or can't wear shoes/sandals    Negative: [1] Last tetanus shot > 5 years ago AND [2] DIRTY cut or scrape    Negative: [1] Last tetanus shot > 10 years ago AND [2] CLEAN cut or scrape (e.g., object AND skin were clean)    Negative: [1] Has diabetes (diabetes mellitus) AND [2] small cut or scrape    Protocols used: TOE INJURY-A-AH

## 2021-05-12 NOTE — PROGRESS NOTES
Assessment & Plan       ICD-10-CM    1. Closed nondisplaced fracture of middle phalanx of lesser toe of left foot, initial encounter  S92.525A    2. Toe injury, left, initial encounter  S99.922A XR Toe Left G/E 2 Views   3. Pain of toe of left foot  M79.675 XR Toe Left G/E 2 Views   4. Screening for malignant neoplasm of cervix  Z12.4       1, 2, 3.  Discussed the nature of the fracture and recommended immobilization of her toe.  Initially, buddy tape the toe, but then discussed with podiatry and recommended more stringent immobilization.  Follow-up in 2 to 4 weeks.  4.  Encourage patient to get her preventive health up-to-date.  She is due for Pap, Covid vaccination, and other immunizations.    Portions of this note were completed using Dragon dictation software.  Although reviewed, there may be typographical and other inadvertent errors that remain.       Discussion of management or test interpretation with external physician/other qualified healthcare professional/appropriate source - Spoke with podiatry, Dr. Albrecht  Ordering of each unique test  35 minutes spent on the date of the encounter doing chart review, history and exam, documentation and further activities per the note       Patient Instructions   Reapply buddy tape for the next 2-6 weeks for support.     Can apply ice as needed to help with pain and swelling.     Can take Tylenol to help with pain.     Continue to be weight bearing as tolerated.     Elevate your foot above the level of the heart when laying down.     Be sure to use shoes with a protective sole to help prevent further injury.      If not able to control your symptoms, we can get a bigger medical boot.    Contact us or return if questions or concerns.     Have a nice day!    Dr. Love                 No follow-ups on file.    Gilbert Love MD, MD  [facility] CARLOS Stein is a 30 year old who presents for the following health issues     HPI  "    Musculoskeletal problem  Onset/Duration: 2 days  Description  Location: 2nd toe - left  Joint Swelling: YES  Redness: YES and bruising  Pain: YES  Warmth: no  Intensity:  mild  History  Trauma to the area: YES, two days ago was going up the stairs and jammed her toe.  Recent illness:  no  Previous similar problem: no  Previous evaluation:  no  Precipitating or alleviating factors:  Aggravating factors include: pressure and bending  Therapies tried and outcome: ice    Tripped up the stairs 2 days ago. Tried ice, seemed to help a little bit.   Swelling was a lot worse last night.     Area of ecchymosis is spreading to foot.  Unable to bend toe.       Review of Systems   Constitutional, HEENT, cardiovascular, pulmonary, gi and gu systems are negative, except as otherwise noted.      Objective    BP (!) 82/62   Pulse 78   Temp 97.5  F (36.4  C) (Temporal)   Ht 1.645 m (5' 4.76\")   Wt 59 kg (130 lb)   LMP 05/01/2021 (Exact Date)   SpO2 98%   BMI 21.79 kg/m    Body mass index is 21.79 kg/m .  Physical Exam   GENERAL: healthy, alert and no distress  NECK: no adenopathy, no asymmetry, masses, or scars and thyroid normal to palpation  RESP: lungs clear to auscultation - no rales, rhonchi or wheezes  CV: regular rate and rhythm, normal S1 S2, no S3 or S4, no murmur, click or rub, no peripheral edema and peripheral pulses strong  ABDOMEN: soft, nontender, no hepatosplenomegaly, no masses and bowel sounds normal  MS: no gross musculoskeletal defects noted, no edema    No results found for this visit on 05/12/21.    Physician Attestation   I, Gilbert Love MD, was present with the medical/MICHAEL student who participated in the service and in the documentation of the note.  I have verified the history and personally performed the physical exam and medical decision making.  I agree with the assessment and plan of care as documented in the note.      Items personally reviewed: vitals, labs and imaging and agree with " the interpretation documented in the note.    Gilbert Love MD, MD

## 2021-05-12 NOTE — PATIENT INSTRUCTIONS
Reapply buddy tape for the next 2-6 weeks for support.     Can apply ice as needed to help with pain and swelling.     Can take Tylenol to help with pain.     Continue to be weight bearing as tolerated.     Elevate your foot above the level of the heart when laying down.     Be sure to use shoes with a protective sole to help prevent further injury.      If not able to control your symptoms, we can get a bigger medical boot.    Contact us or return if questions or concerns.     Have a nice day!    Dr. Love

## 2021-05-13 ENCOUNTER — ALLIED HEALTH/NURSE VISIT (OUTPATIENT)
Dept: FAMILY MEDICINE | Facility: CLINIC | Age: 31
End: 2021-05-13

## 2021-05-13 DIAGNOSIS — S92.525A CLOSED NONDISPLACED FRACTURE OF MIDDLE PHALANX OF LESSER TOE OF LEFT FOOT, INITIAL ENCOUNTER: Primary | ICD-10-CM

## 2021-05-13 PROCEDURE — 99207 PR NO CHARGE NURSE ONLY: CPT

## 2021-05-13 NOTE — NURSING NOTE
Dispensed 1 Pneumatic Walking Brace, Size Medium, with [facility] agreement signed by patient. Alyson Ricks CMA, May 13, 2021

## 2021-05-15 ENCOUNTER — MYC MEDICAL ADVICE (OUTPATIENT)
Dept: FAMILY MEDICINE | Facility: CLINIC | Age: 31
End: 2021-05-15

## 2021-05-20 ENCOUNTER — VIRTUAL VISIT (OUTPATIENT)
Dept: FAMILY MEDICINE | Facility: CLINIC | Age: 31
End: 2021-05-20

## 2021-05-20 ENCOUNTER — TELEPHONE (OUTPATIENT)
Dept: FAMILY MEDICINE | Facility: CLINIC | Age: 31
End: 2021-05-20

## 2021-05-20 DIAGNOSIS — R30.0 DYSURIA: Primary | ICD-10-CM

## 2021-05-20 DIAGNOSIS — R30.0 DYSURIA: ICD-10-CM

## 2021-05-20 LAB
ALBUMIN UR-MCNC: NEGATIVE MG/DL
APPEARANCE UR: ABNORMAL
BILIRUB UR QL STRIP: NEGATIVE
COLOR UR AUTO: YELLOW
GLUCOSE UR STRIP-MCNC: NEGATIVE MG/DL
HGB UR QL STRIP: NEGATIVE
KETONES UR STRIP-MCNC: NEGATIVE MG/DL
LEUKOCYTE ESTERASE UR QL STRIP: NEGATIVE
NITRATE UR QL: NEGATIVE
PH UR STRIP: 8 PH (ref 5–7)
RBC #/AREA URNS AUTO: <1 /HPF (ref 0–2)
SOURCE: ABNORMAL
SP GR UR STRIP: 1 (ref 1–1.03)
SQUAMOUS #/AREA URNS AUTO: 5 /HPF (ref 0–1)
UROBILINOGEN UR STRIP-MCNC: 0 MG/DL (ref 0–2)
WBC #/AREA URNS AUTO: 3 /HPF (ref 0–5)

## 2021-05-20 PROCEDURE — 87086 URINE CULTURE/COLONY COUNT: CPT | Performed by: OBSTETRICS & GYNECOLOGY

## 2021-05-20 PROCEDURE — 81001 URINALYSIS AUTO W/SCOPE: CPT | Performed by: OBSTETRICS & GYNECOLOGY

## 2021-05-20 PROCEDURE — 99213 OFFICE O/P EST LOW 20 MIN: CPT | Mod: 95 | Performed by: OBSTETRICS & GYNECOLOGY

## 2021-05-20 RX ORDER — NITROFURANTOIN 25; 75 MG/1; MG/1
100 CAPSULE ORAL 2 TIMES DAILY
Qty: 14 CAPSULE | Refills: 0 | Status: SHIPPED | OUTPATIENT
Start: 2021-05-20 | End: 2021-09-08

## 2021-05-20 NOTE — TELEPHONE ENCOUNTER
Left a message for the patient to call the clinic.  Dr. Batista would like to talk with her about her Urinalysis she had done today.  When she calls back please call Dr. Batista so he can speak to the patient due to Dr. Batista being virtual today.  Vance Benson MA

## 2021-05-20 NOTE — TELEPHONE ENCOUNTER
Patient calling and stating she needs to change her lab appointment for today to an earlier time, and is requesting 12:30 pm. Appointment has been changed. Lizeth Cavazos LPN

## 2021-05-20 NOTE — PROGRESS NOTES
Sarita is a 30 year old who is being evaluated via a billable telephone visit.      What phone number would you like to be contacted at? [phone]  How would you like to obtain your AVS? Myron    Subjective: Eden requested a phone consultation today because of concerns regarding  possible UTI. Due to the current covid-19 coronavirus epidemic we are managing much of our patients' concerns remotely when possible.     for 1 week she has had dysuria- no fever or backache- the sx are very similar to a previous UTI she had while pregnant- she is increasing fluids-otherwise doing ok. No vomiting. No hematuria.    The past medical history and medications and allergies have been reviewed today by me.  .  Past Medical History:   Diagnosis Date     NO ACTIVE PROBLEMS      Allergies   Allergen Reactions     Cephalexin Rash     Cephalosporins Rash     This occurred on the third day of using cephalexin. Was not observed by MD but reported as a rash on abdomen and legs.     No current outpatient medications on file.       Assessment/Plan:  Dysuria-will order UA and call with results- also I advised her to increase fluids- we will treat with abx if indicated- and culture the urine if it is +      Phone call duration was   11  minutes.     RICARDA Batista MD

## 2021-05-21 LAB
BACTERIA SPEC CULT: NORMAL
Lab: NORMAL
SPECIMEN SOURCE: NORMAL

## 2021-05-24 NOTE — RESULT ENCOUNTER NOTE
Marisol/team,Please inform Eden/ or caretaker  that this result(s) is/are normal.  Thanks. RICARDA Batista MD

## 2021-09-08 ENCOUNTER — VIRTUAL VISIT (OUTPATIENT)
Dept: FAMILY MEDICINE | Facility: OTHER | Age: 31
End: 2021-09-08

## 2021-09-08 DIAGNOSIS — J02.9 SORE THROAT: ICD-10-CM

## 2021-09-08 DIAGNOSIS — R50.9 FEVER AND CHILLS: Primary | ICD-10-CM

## 2021-09-08 PROCEDURE — 99213 OFFICE O/P EST LOW 20 MIN: CPT | Mod: 95 | Performed by: FAMILY MEDICINE

## 2021-09-08 NOTE — PATIENT INSTRUCTIONS
Thank you for visiting [facility]    I am glad you are starting to improve.  Let us know if that does not continue.    If you have worsening fevers, cough, shortness of breath, change in smell or taste, etc., then we should consider Covid testing.  We can also obtain a strep screen if you would like.    I would recommend that you avoid being around others outside of your household until you are sure that you are well.    I do strongly recommend Covid vaccination.  The risks of getting Covid greatly exceed the risks of vaccination.  Let me know what questions and concerns you have.    You are also due for a Pap and tetanus shot based upon our records.    Contact us or return if questions or concerns.     Have a nice day!    Dr. Love     No follow-ups on file.      If you need medication refills, please contact your pharmacy 3 days before your prescriptions runs out or download the [facility] ang for your smart phone. If you are out of refills, your pharmacy will contact contact the clinic.                                     [facility] Assistance [phone]

## 2021-09-08 NOTE — PROGRESS NOTES
Sarita is a 30 year old who is being evaluated via a billable video visit.      How would you like to obtain your AVS? MyChart  If the video visit is dropped, the invitation should be resent by: Text to cell phone: [phone]  Will anyone else be joining your video visit? No      Video Start Time: 2:34 PM    Assessment & Plan       ICD-10-CM    1. Fever and chills  R50.9    2. Sore throat  J02.9       Patient actually reports that she is starting to feel better today.  Because of this, I was somewhat hesitant to proceed with testing today.  Did discuss that we could obtain Covid or strep testing.  She is not around a lot of others and is not particularly worried about either Covid exposure or risk to others.  Additionally, her symptoms are not classic for Covid.  She will continue to monitor her symptoms and let me know if she has any clinical worsening.  If so, we will proceed with Covid and/or strep testing.  Otherwise, we will continue with supportive cares.  I did recommend trial of over-the-counter analgesics.  She is to let us know if not continuing to have improvement.    I did encourage Covid vaccination.  She will consider this when she is feeling better.  I also recommended updating her Pap and tetanus shot.    Portions of this note were completed using Dragon dictation software.  Although reviewed, there may be typographical and other inadvertent errors that remain.         28 minutes spent on the date of the encounter doing chart review, history and exam, documentation and further activities per the note       Patient Instructions   Thank you for visiting [facility]    I am glad you are starting to improve.  Let us know if that does not continue.    If you have worsening fevers, cough, shortness of breath, change in smell or taste, etc., then we should consider Covid testing.  We can also obtain a strep screen if you would like.    I would recommend that you avoid being around others  outside of your household until you are sure that you are well.    I do strongly recommend Covid vaccination.  The risks of getting Covid greatly exceed the risks of vaccination.  Let me know what questions and concerns you have.    You are also due for a Pap and tetanus shot based upon our records.    Contact us or return if questions or concerns.     Have a nice day!    Dr. Love     No follow-ups on file.      If you need medication refills, please contact your pharmacy 3 days before your prescriptions runs out or download the [facility] ang for your smart phone. If you are out of refills, your pharmacy will contact contact the clinic.                                     [facility] Assistance [phone]                       Return if symptoms worsen or fail to improve.    Gilbert Love MD, MD  [facility] CARLOS Stein is a 30 year old who presents for the following health issues     HPI     Acute Illness  Acute illness concerns: Sore throat  Onset/Duration: 4 days ago  Symptoms:  Fever: YES  Chills/Sweats: no  Headache (location?): slight, improved with hydration  Sinus Pressure: no  Conjunctivitis:  no  Ear Pain: no  Rhinorrhea: no  Congestion: slight  Sore Throat: YES  Cough: no  Wheeze: no  Decreased Appetite: no  Nausea: no  Vomiting: no  Diarrhea: no  Dysuria/Freq.: no  Dysuria or Hematuria: no  Fatigue/Achiness: no  Sick/Strep Exposure: YES, her children had low-grade fevers for less than 24 hours.    Therapies tried and outcome: Nyquil - helped with sleep.  Hasn't taken any analgesics.      Pt has had a fever on Saturday.  Developed a ST on day 2.  This has since improved.  Today, was the first time her fever has broken for more than a few hours.    Her throat didn't look worrisome at all.  Took a picture yesterday.  Initially, thought it might be strep, but now she's not too worried about that.    Review of Systems   Constitutional, HEENT, cardiovascular,  pulmonary, gi and gu systems are negative, except as otherwise noted.      Objective           Vitals:  No vitals were obtained today due to virtual visit.    Physical Exam   GENERAL: Healthy, alert and no distress  EYES: Eyes grossly normal to inspection.  No discharge or erythema, or obvious scleral/conjunctival abnormalities.  RESP: No audible wheeze, cough, or visible cyanosis.  No visible retractions or increased work of breathing.    SKIN: Visible skin clear. No significant rash, abnormal pigmentation or lesions.  NEURO: Cranial nerves grossly intact.  Mentation and speech appropriate for age.  PSYCH: Mentation appears normal, affect normal/bright, judgement and insight intact, normal speech and appearance well-groomed.    Orders Only on 05/20/2021   Component Date Value Ref Range Status     Color Urine 05/20/2021 Yellow   Final     Appearance Urine 05/20/2021 Slightly Cloudy   Final     Glucose Urine 05/20/2021 Negative  NEG^Negative mg/dL Final     Bilirubin Urine 05/20/2021 Negative  NEG^Negative Final     Ketones Urine 05/20/2021 Negative  NEG^Negative mg/dL Final     Specific Gravity Urine 05/20/2021 1.004  1.003 - 1.035 Final     Blood Urine 05/20/2021 Negative  NEG^Negative Final     pH Urine 05/20/2021 8.0* 5.0 - 7.0 pH Final     Protein Albumin Urine 05/20/2021 Negative  NEG^Negative mg/dL Final     Urobilinogen mg/dL 05/20/2021 0.0  0.0 - 2.0 mg/dL Final     Nitrite Urine 05/20/2021 Negative  NEG^Negative Final     Leukocyte Esterase Urine 05/20/2021 Negative  NEG^Negative Final     Source 05/20/2021 Midstream Urine   Final     RBC Urine 05/20/2021 <1  0 - 2 /HPF Final     WBC Urine 05/20/2021 3  0 - 5 /HPF Final     Squamous Epithelial /HPF Urine 05/20/2021 5* 0 - 1 /HPF Final               Video-Visit Details    Type of service:  Video Visit    Video End Time:2:57 PM    Originating Location (pt. Location): Home    Distant Location (provider location):  [facility]      Platform used for Video Visit: Pj

## 2021-09-09 ENCOUNTER — MYC MEDICAL ADVICE (OUTPATIENT)
Dept: FAMILY MEDICINE | Facility: OTHER | Age: 31
End: 2021-09-09

## 2021-09-09 DIAGNOSIS — Z20.822 SUSPECTED COVID-19 VIRUS INFECTION: Primary | ICD-10-CM

## 2021-09-13 NOTE — TELEPHONE ENCOUNTER
S: covid  B: daughter diagnosed with covid and patient also has had symptoms for the last 9 days.  A: pt was wondering if she needs to get steroids for covid.  Pt is able to do normal activities without any shortness of breath.   States yesterday she was able to move a 70# bean bag up the stairs without any difficulty.  Coughing occasionally.  Not coughing at night. Describes the cough as mild. No fever.  Generally feeling like things are improving.    R: discussed with patient what steriods do and the side effects from them.  Because pt is feeling better each day will wait on steroids.  Advised to message back if symptoms do not continue to improve or if symptoms worsen.

## 2021-09-14 ENCOUNTER — E-VISIT (OUTPATIENT)
Dept: FAMILY MEDICINE | Facility: CLINIC | Age: 31
End: 2021-09-14

## 2021-09-14 DIAGNOSIS — U07.1 2019 NOVEL CORONAVIRUS DISEASE (COVID-19): Primary | ICD-10-CM

## 2021-09-16 ENCOUNTER — OFFICE VISIT (OUTPATIENT)
Dept: FAMILY MEDICINE | Facility: CLINIC | Age: 31
End: 2021-09-16
Payer: OTHER GOVERNMENT

## 2021-09-16 VITALS
OXYGEN SATURATION: 96 % | DIASTOLIC BLOOD PRESSURE: 64 MMHG | TEMPERATURE: 97.2 F | WEIGHT: 126.38 LBS | HEART RATE: 72 BPM | BODY MASS INDEX: 21.18 KG/M2 | SYSTOLIC BLOOD PRESSURE: 118 MMHG | RESPIRATION RATE: 18 BRPM

## 2021-09-16 DIAGNOSIS — U07.1 2019 NOVEL CORONAVIRUS DISEASE (COVID-19): Primary | ICD-10-CM

## 2021-09-16 PROCEDURE — 99213 OFFICE O/P EST LOW 20 MIN: CPT | Performed by: PHYSICIAN ASSISTANT

## 2021-09-16 ASSESSMENT — PAIN SCALES - GENERAL: PAINLEVEL: NO PAIN (0)

## 2021-09-16 NOTE — PROGRESS NOTES
Adiel Stein is a 30 year old who presents for the following health issues    HPI     Patient had an appointment on 9/10/2021 was dx with COVID. Wanted to be seen to make sure her lungs are good and she can exercise again.     Patient presents today for follow-up after being diagnosed with COVID-19. She reports she was officially diagnosed on 9/10/2021 but symptoms had started 6 days prior. She was not vaccinated. Others in the household with same symptoms but no contact they are aware of. Patient reports that symptoms started with loss of taste and smell. She then developed a fever for about 3 days. This would start in the afternoon and be improved by morning. Highest was 104 but never really felt feverish. Had a sore throat for about 3 days as well. Then had a cough. This was very mild. Never kept her up at night. Did have her period at the same time so unsure about heart palpitations as this is common during her cycles. She overall is feeling much better. She does note a few instances that she has had chills and her pulse rate has been lower (50-60). Otherwise feels well. Just wants to ensure everything looks and sounds good before returning to normal activity.    Review of Systems   Constitutional, HEENT, cardiovascular, pulmonary, gi and gu systems are negative, except as otherwise noted.      Objective    /64   Pulse 118   Temp 97.2  F (36.2  C) (Temporal)   Resp 18   Wt 57.3 kg (126 lb 6 oz)   LMP 09/14/2021   SpO2 96%   Breastfeeding No   BMI 21.18 kg/m    Body mass index is 21.18 kg/m .  Physical Exam   GENERAL: healthy, alert and no distress  EYES: Eyes grossly normal to inspection, PERRL and conjunctivae and sclerae normal  HENT: ear canals and TM's normal, nose and mouth without ulcers or lesions  NECK: no adenopathy, no asymmetry, masses, or scars and thyroid normal to palpation  RESP: lungs clear to auscultation - no rales, rhonchi or wheezes  CV: regular rate and rhythm, normal  S1 S2, no S3 or S4, no murmur, click or rub, no peripheral edema and peripheral pulses strong  ABDOMEN: soft, nontender, no hepatosplenomegaly, no masses and bowel sounds normal  MS: no gross musculoskeletal defects noted, no edema  SKIN: no suspicious lesions or rashes  NEURO: Normal strength and tone, mentation intact and speech normal  PSYCH: mentation appears normal, affect normal/bright        Assessment & Plan     2019 novel coronavirus disease (COVID-19)  Patient seems to be recovering very well since having COVID-19. Discussed she is certainly able to return to normal activity as tolerated. Encouraged good water intake and adequate nutrition. We discussed complications that can occur after having COVID and when to seek immediate care.     Patient to schedule preventative health visit at her convenience.    The patient indicates understanding of these issues and agrees with the plan.    Christine Davison PA-C  [facility]

## 2021-10-03 ENCOUNTER — HEALTH MAINTENANCE LETTER (OUTPATIENT)
Age: 31
End: 2021-10-03

## 2021-11-27 ENCOUNTER — E-VISIT (OUTPATIENT)
Dept: FAMILY MEDICINE | Facility: CLINIC | Age: 31
End: 2021-11-27

## 2021-11-27 DIAGNOSIS — H60.549 ECZEMA OF EXTERNAL EAR, UNSPECIFIED LATERALITY: Primary | ICD-10-CM

## 2021-11-27 PROCEDURE — 99421 OL DIG E/M SVC 5-10 MIN: CPT | Performed by: PHYSICIAN ASSISTANT

## 2021-11-28 RX ORDER — TRIAMCINOLONE ACETONIDE 1 MG/G
CREAM TOPICAL 2 TIMES DAILY
Qty: 30 G | Refills: 1 | Status: SHIPPED | OUTPATIENT
Start: 2021-11-28 | End: 2024-02-01

## 2022-03-14 ENCOUNTER — E-VISIT (OUTPATIENT)
Dept: FAMILY MEDICINE | Facility: CLINIC | Age: 32
End: 2022-03-14

## 2022-03-14 DIAGNOSIS — H10.33 ACUTE BACTERIAL CONJUNCTIVITIS OF BOTH EYES: Primary | ICD-10-CM

## 2022-03-14 PROCEDURE — 99421 OL DIG E/M SVC 5-10 MIN: CPT | Performed by: PHYSICIAN ASSISTANT

## 2022-03-14 RX ORDER — POLYMYXIN B SULFATE AND TRIMETHOPRIM 1; 10000 MG/ML; [USP'U]/ML
1-2 SOLUTION OPHTHALMIC EVERY 4 HOURS
Qty: 10 ML | Refills: 0 | Status: SHIPPED | OUTPATIENT
Start: 2022-03-14 | End: 2022-03-21

## 2022-03-14 NOTE — PATIENT INSTRUCTIONS
Thank you for choosing us for your care. I have placed an order for a prescription so that you can start treatment. View your full visit summary for details by clicking on the link below. Your pharmacist will able to address any questions you may have about the medication.     If you re not feeling better within 2-3 days, please schedule an appointment.  You can schedule an appointment right here in [facility], or call [phone]  If the visit is for the same symptoms as your eVisit, we ll refund the cost of your eVisit if seen within seven days.

## 2022-03-20 ENCOUNTER — HEALTH MAINTENANCE LETTER (OUTPATIENT)
Age: 32
End: 2022-03-20

## 2022-06-18 ENCOUNTER — E-VISIT (OUTPATIENT)
Dept: FAMILY MEDICINE | Facility: CLINIC | Age: 32
End: 2022-06-18

## 2022-06-18 DIAGNOSIS — N39.0 ACUTE UTI (URINARY TRACT INFECTION): Primary | ICD-10-CM

## 2022-06-18 PROCEDURE — 99421 OL DIG E/M SVC 5-10 MIN: CPT | Performed by: PHYSICIAN ASSISTANT

## 2022-06-19 RX ORDER — AMOXICILLIN 875 MG
875 TABLET ORAL 2 TIMES DAILY
Qty: 14 TABLET | Refills: 0 | Status: SHIPPED | OUTPATIENT
Start: 2022-06-19 | End: 2022-06-26

## 2022-06-19 NOTE — PATIENT INSTRUCTIONS
Dear Eden Yanez    After reviewing your responses, I've been able to diagnose you with a urinary tract infection, which is a common infection of the bladder with bacteria.  This is not a sexually transmitted infection, though urinating immediately after intercourse can help prevent infections.  Drinking lots of fluids is also helpful to clear your current infection and prevent the next one.      I have sent a prescription for antibiotics to your pharmacy to treat this infection. This is safe in pregnancy.     It is important that you take all of your prescribed medication even if your symptoms are improving after a few doses.  Taking all of your medicine helps prevent the symptoms from returning.     If your symptoms worsen, you develop pain in your back or stomach, develop fevers, or are not improving in 5 days, please contact your primary care provider for an appointment or visit any of our convenient Walk-in or Urgent Care Centers to be seen, which can be found on our website here.    Thanks again for choosing us as your health care partner,    Christine Davison PA-C

## 2022-09-11 ENCOUNTER — HEALTH MAINTENANCE LETTER (OUTPATIENT)
Age: 32
End: 2022-09-11

## 2023-04-30 ENCOUNTER — HEALTH MAINTENANCE LETTER (OUTPATIENT)
Age: 33
End: 2023-04-30

## 2023-10-14 NOTE — TELEPHONE ENCOUNTER
Appointment made.mycharted patient  Adia PEREZ MA    
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    
Reason for Call:  Same Day Appointment, Requested Provider:  Miryam Perez M.D.    PCP: Miryam Perez    Reason for visit: Painful intercourse    Duration of symptoms: about 2 and a half years    Have you been treated for this in the past? No    Additional comments: patient called stating she has been having pain during intercourse, for about 2 and a half years. Patient states she thought it would get better, or go away, but it hasn't. Patient states she would like to be examined, and be seen for this. Patient is asking if  would be able to work her in on the same day as her children's well child checks. Patient's childrens' appointments are on 4-10-17 at 1:45 and 2:15. Patient is aware Dr. CLEMENT is out of the office until Monday. Please advise.    Can we leave a detailed message on this number? YES    Phone number patient can be reached at: Cell number on file:    Telephone Information:   Mobile [phone]       Best Time: anytime    Call taken on 3/23/2017 at 12:22 PM by Ange Bejarano    
room air

## 2024-01-28 ENCOUNTER — MYC MEDICAL ADVICE (OUTPATIENT)
Dept: FAMILY MEDICINE | Facility: CLINIC | Age: 34
End: 2024-01-28

## 2024-01-30 ENCOUNTER — E-VISIT (OUTPATIENT)
Dept: URGENT CARE | Facility: CLINIC | Age: 34
End: 2024-01-30

## 2024-01-30 DIAGNOSIS — J02.9 SORE THROAT: Primary | ICD-10-CM

## 2024-01-30 PROCEDURE — 99207 PR NON-BILLABLE SERV PER CHARTING: CPT | Performed by: NURSE PRACTITIONER

## 2024-01-30 NOTE — PATIENT INSTRUCTIONS
Dear Sarita,    After reviewing your responses, I would like you to come in for a swab to make sure we treat you correctly. This swab is to evaluate you for possible Strep Throat, and should be scheduled for today or tomorrow. Please use the Schedule Now button in [employer] to schedule your swab. Otherwise, click this link to schedule a lab only appointment.    Lab appointments are not available at most locations on the weekends. If no Lab Only appointment is available, you should be seen in any of our convenient Urgent Care Centers for an in person visit, which can be found on our website here.    You will receive instructions with your results in [employer] once they are available.     If your symptoms worsen, you develop difficulty breathing, difficulty with drinking enough to stay hydrated, difficulty swallowing your saliva or have fevers for more than 5 days, please contact your primary care provider for an appointment or visit an Urgent Care Center to be seen.      Thanks again for choosing us as your health care partner.   Ermelinda Juarez, CNP  Sore Throat: Care Instructions  Overview     Infection by bacteria or a virus causes most sore throats. Cigarette smoke, dry air, air pollution, allergies, and yelling can also cause a sore throat. Sore throats can be painful and annoying. Fortunately, most sore throats go away on their own. If you have a bacterial infection, your doctor may prescribe antibiotics.  Follow-up care is a key part of your treatment and safety. Be sure to make and go to all appointments, and call your doctor if you are having problems. It's also a good idea to know your test results and keep a list of the medicines you take.  How can you care for yourself at home?  If your doctor prescribed antibiotics, take them as directed. Do not stop taking them just because you feel better. You need to take the full course of antibiotics.  Gargle with warm salt water several times a day to help reduce  "swelling and relieve pain. Mix 1/2 teaspoon of salt in 1 cup of warm water.  Take an over-the-counter pain medicine, such as acetaminophen (Tylenol), ibuprofen (Advil, Motrin), or naproxen (Aleve). Read and follow all instructions on the label.  Be careful when taking over-the-counter cold or flu medicines and Tylenol at the same time. Many of these medicines have acetaminophen, which is Tylenol. Read the labels to make sure that you are not taking more than the recommended dose. Too much acetaminophen (Tylenol) can be harmful.  Drink plenty of fluids. Fluids may help soothe an irritated throat. Hot fluids, such as tea or soup, may help decrease throat pain.  Use over-the-counter throat lozenges to soothe pain. Regular cough drops or hard candy may also help. These should not be given to young children because of the risk of choking.  Do not smoke or allow others to smoke around you. If you need help quitting, talk to your doctor about stop-smoking programs and medicines. These can increase your chances of quitting for good.  Use a vaporizer or humidifier to add moisture to your bedroom. Follow the directions for cleaning the machine.  When should you call for help?   Call your doctor now or seek immediate medical care if:    You have trouble breathing.     Your sore throat gets much worse on one side.     You have new or worse trouble swallowing.     You have a new or higher fever.   Watch closely for changes in your health, and be sure to contact your doctor if you do not get better as expected.  Where can you learn more?  Go to https://www.EyeEm.net/patiented  Enter U420 in the search box to learn more about \"Sore Throat: Care Instructions.\"  Current as of: February 28, 2023               Content Version: 13.8    3274-7565 [employer].   Care instructions adapted under license by your healthcare professional. If you have questions about a medical condition or this instruction, always ask your " healthcare professional. [employer] disclaims any warranty or liability for your use of this information.

## 2024-01-31 ENCOUNTER — LAB (OUTPATIENT)
Dept: FAMILY MEDICINE | Facility: CLINIC | Age: 34
End: 2024-01-31
Attending: NURSE PRACTITIONER

## 2024-01-31 ENCOUNTER — MYC MEDICAL ADVICE (OUTPATIENT)
Dept: FAMILY MEDICINE | Facility: CLINIC | Age: 34
End: 2024-01-31

## 2024-01-31 DIAGNOSIS — J02.9 SORE THROAT: ICD-10-CM

## 2024-01-31 LAB
DEPRECATED S PYO AG THROAT QL EIA: NEGATIVE
GROUP A STREP BY PCR: NOT DETECTED

## 2024-01-31 PROCEDURE — 87651 STREP A DNA AMP PROBE: CPT

## 2024-01-31 NOTE — TELEPHONE ENCOUNTER
Patient calling to schedule an appointment for further evaluation of on going throat pain, swollen lymph nodes and negative strep test. Appointment has been scheduled with same day provider Nicole for tomorrow, 2/1/24 with a 10:40 am arrival. Lizeth Cavazos LPN

## 2024-02-01 ENCOUNTER — OFFICE VISIT (OUTPATIENT)
Dept: FAMILY MEDICINE | Facility: CLINIC | Age: 34
End: 2024-02-01

## 2024-02-01 VITALS
BODY MASS INDEX: 23.71 KG/M2 | DIASTOLIC BLOOD PRESSURE: 60 MMHG | RESPIRATION RATE: 16 BRPM | HEART RATE: 76 BPM | OXYGEN SATURATION: 98 % | SYSTOLIC BLOOD PRESSURE: 110 MMHG | TEMPERATURE: 97.2 F | WEIGHT: 138.9 LBS | HEIGHT: 64 IN

## 2024-02-01 DIAGNOSIS — R09.81 NASAL CONGESTION: ICD-10-CM

## 2024-02-01 DIAGNOSIS — R59.9 SWOLLEN LYMPH NODES: ICD-10-CM

## 2024-02-01 DIAGNOSIS — H10.023 PINK EYE DISEASE OF BOTH EYES: ICD-10-CM

## 2024-02-01 DIAGNOSIS — R07.0 THROAT PAIN: Primary | ICD-10-CM

## 2024-02-01 DIAGNOSIS — R09.82 POST-NASAL DRIP: ICD-10-CM

## 2024-02-01 LAB — MONOCYTES NFR BLD AUTO: NEGATIVE %

## 2024-02-01 PROCEDURE — 86308 HETEROPHILE ANTIBODY SCREEN: CPT

## 2024-02-01 PROCEDURE — 36415 COLL VENOUS BLD VENIPUNCTURE: CPT

## 2024-02-01 PROCEDURE — 99213 OFFICE O/P EST LOW 20 MIN: CPT

## 2024-02-01 RX ORDER — POLYMYXIN B SULFATE AND TRIMETHOPRIM 1; 10000 MG/ML; [USP'U]/ML
1-2 SOLUTION OPHTHALMIC EVERY 4 HOURS
Qty: 10 ML | Refills: 0 | Status: SHIPPED | OUTPATIENT
Start: 2024-02-01 | End: 2024-02-08

## 2024-02-01 ASSESSMENT — PAIN SCALES - GENERAL: PAINLEVEL: MILD PAIN (2)

## 2024-02-01 ASSESSMENT — ENCOUNTER SYMPTOMS: SORE THROAT: 1

## 2024-02-01 NOTE — PROGRESS NOTES
Assessment & Plan     Throat pain  - Mononucleosis screen; Future  - Mononucleosis screen    Swollen lymph nodes  - Mononucleosis screen; Future  - Mononucleosis screen    Post-nasal drip    Nasal congestion    Pink eye disease of both eyes  - polymixin b-trimethoprim (POLYTRIM) 80972-8.1 UNIT/ML-% ophthalmic solution; Place 1-2 drops into both eyes every 4 hours for 7 days    See patient instructions    Ordering of each unique test  Prescription drug management  I spent a total of 26 minutes on the day of the visit.   Time spent by me doing chart review, history and exam, documentation and further activities per the note        See Patient Instructions  Patient Instructions   Addendum 2/8/2024:  Patient called with persistent sore throat.  Her other symptoms have resolved.  I sent a [employer] message stating the following:  Lorenzo Stein,    I am glad to hear that you are feeling better.  I am sorry to hear that you are still having a persistent sore throat.  I recommend scheduling a follow-up visit to further assess and discuss a treatment plan.  There are many things that can cause a sore throat and it could be allergies or it could be gastric reflux even if you are not having symptoms of heartburn symptoms.  I would recommend starting over-the-counter omeprazole in the morning, take famotidine at bedtime.  You may also continue with Zyrtec to treat for possible allergies and Flonase nasal spray.  When you use the Flonase, do not sniffle you use it.  You should put the tip of the Flonase bottle in your nose and point out towards your ear, spray it, and it should sit up in the nasal cavity area.  You should not be able to taste the Flonase if you are using it correctly.  I recommend looking up a video for proper nasal spray techniques because it can make a big difference.  I recommend continuing with cold beverages, Tylenol/ibuprofen as needed, honey, cough drops, and over-the-counter throat spray.  I recommend  increasing humidity and putting a humidifier in your room.    Regi Rivera PA-C        Offered testing for mono, flu, COVID, and other viral testing.  Patient would like mono testing today.  She declined viral swabs.  She did test negative for strep throat yesterday.  Discussed viral versus bacterial etiology.    Pink eye  Polytrim for 7 days    Post nasal drip/nasal congestion  -Flonase nasal spray OTC  - zyrtec or decongestant as needed    Sore throat  Throat spray OTC  Lozenges    Globus sensation  Consider OTC Pepcid or omeprazole  Monitor for symptoms of heartburn    Follow up with primary care doctor in 1 week if symptoms are not improving.  Sooner if symptoms worsen.    Worrisome symptoms please go to the emergency department.      GENERAL   [] Drink extra water and other fluids (water being the best)   [] For sore throats in adults and children over 4 years old, use sore throat spray or lozenges   [] Menthol rub (such as Ash's, very helpful in kids)   [] LOTS of hand washing (or hand ) and covering coughs   [] Zinc acetate/gluconate lozenges can shorten course of cold symptoms (no benefit for kids)   [] Stop smoking or being around those who do smoke   [] Rest (consider work/school note)   [] Time - these infections can last up to 2 weeks or more (At times some people can get re-infected with another virus and prolong the cold symptoms. 90% of children are better within 23 days).      SPECIFIC MEDICATIONS (Over the Counter)      [] Fever, aches, ear pain, throat pain (adults):?   [] Tylenol every 6-8 hours as needed while awake AND/OR  ibuprofen every 6-8 hours while awake (take with food and large glass of water). Read bottle for dosing instructions.    **Avoid Ibuprofen/Aleve/Motrin if you have kidney disease or gastric ulcer history. Please ask your doctor if you have questions/concerns about this.**      [] Congestion:?   [] Nasal Saline (Simply Saline), mist vaporizer, steam, humidifier,  and Neti Pot   [] Atrovent (ipratropium) spray (can be used 4 times a day and use for 3 weeks), or Afrin (oxymetazoline) 2 sprays each nostril, 1-2 times a day for 3 days (understanding the potential of rebound congestion).?   [] Considering bulb suctioning and NoseFrida for kids.   [] Phenylephrine (avoid in kids under 5, spray examples are Little Remedies and? Minor-Synephrine, oral examples are Sudafed)?   [] Zyrtec D (Cetirizine/antihistamine/pseudoephedrine) OR Allegra D (Fexofenadine/antihistamine/pseudoephedrine) OR Claritin D (Loratadine/antihistamine/pseudoephedrine) -? all for congestion, itch, sneeze, runny nose, watery eyes, itchy throat, and postnasal drip BUT minimal or no help in kids   [] Tylenol (can help runny nose/congestion) and ibuprofen (can help sneezing) - dose details on bottles      [] Cough   [] Use honey in coffee or tea to relieve cough (do not give honey to an infant under 1 year old)   [] Throat lozenges and cough drops   [] Steam, humidifier, etc...     [] Mucous production:?   [] Salt water gargles   [] Mucinex, Robitussin (for both do not use if under the age of 5, minimal help in kids)   [] Mucinex D (pseudoephedrine/guaifenesin - for mucous and congestion) OR Mucinex DM (dextromethorphan/guaifenesin - for mucous and cough) - both minimal/no help in kids      [] Ear Pain/Pressure   [] Antihistamine (Zyrtec/Cetirizine, Allegra/fexofenadine, Claritin/loratadine)? also for itch, sneeze, runny nose, watery eyes, itchy throat, or postnasal drip).  For best results use in combination with nasal sprays (Flonase or Nasocort):   [] Flonase (fluticasone) 2 sprays in each nostril daily for at least 10-14 days, then 1 spray in each nostril per day afterwards for best results (dont use in kids younger than 5).??      Children (up to age 18):   [] Mainstays treatment are analgesics (tylenol and ibuprofen - do NOT use ibuprofen in children under 6 months, contact your doctor if you have concerns  with fever in this age) and nasal saline irrigation (6 times a day for up to 3 weeks).?   [] Honey (over 12 months of age) before bedtime   [] Menthol rub - for congestion, nighttime cough, and sleep (age 2 and older)   [] Warm baths or showered. Humidified air in general.   [] Warm/cool liquids for comfort (even liquids you typically wouldn't give your child I.e. juice or Gatorade)   [] Vitamin C - (may reduce cold symptom duration by 18% if started prior cold symptoms onset)    [] Some children get multiple illnesses in a row and can prolong the recovery course. Sometimes up to 12 illness episodes a year             Patient understood and verbally consented to the treatment plan. Discussed symptoms that would warrant an urgent or emergent visit. All of the patients' questions were answered. Patient was instructed to contact the clinic if questions or concerns arise. Recommend follow up appointments if symptoms worsen or fail to improve. Recommend follow up as needed. Recommend ER in the case of an emergency.    Regi Rivera PA-C    Please note: Voice recognition software may have been used in preparing this note, unintended word substitutions may be present.             Adiel Stein is a 33 year old, presenting for the following health issues:  Pharyngitis (2 weeks)        2/1/2024    10:45 AM   Additional Questions   Roomed by Yisel GONSALEZ         2/1/2024    10:45 AM   Patient Reported Additional Medications   Patient reports taking the following new medications multi vitamin vitamin c     History of Present Illness       Reason for visit:  Sore throat for more than 14 days  Symptom onset:  1-2 weeks ago  Symptoms include:  Sore throat  Symptom intensity:  Moderate  Symptom progression:  Improving  Had these symptoms before:  Yes  Has tried/received treatment for these symptoms:  No  What makes it worse:  No  What makes it better:  Cough drop    She eats 4 or more servings of fruits and vegetables  daily.She consumes 0 sweetened beverage(s) daily.She exercises with enough effort to increase her heart rate 10 to 19 minutes per day.  She exercises with enough effort to increase her heart rate 3 or less days per week.   She is taking medications regularly.     [employer] message 1/31/2024:  Hi Dr. Dodd,     I was tested for strep and it was negative. I was told to wait out whatever it is that I have with very little explanation and no follow up, but it's been over 2 weeks and my throat still hurts and my lymph nodes are really swollen and tender. I haven't been examined by a Dr. either. I don't want to come in to be examined if I don't need to, but I also feel like this is being dismissed without explanation or reassurance. Do you have any thoughts? If it's not better in a week should I do a follow up? If this is something more serious I don't want to just sit and wait for it to get worse.   Thank you for understanding my concern.    E-visit completed on 1/30/2024:  Question 1/30/2024  2:33 PM CST - Filed by Patient   When did symptoms start? 7 days or more   After your symptoms started, did they improve and then get worse again? Yes   Do you currently have these symptoms? None of the above   Please rate the severity of your sore throat symptoms: 1 (mild pain)-10 (worst pain, can t swallow) 3   Are you able to swallow any liquids at all? Yes   Are your lymph nodes or glands enlarged in the neck? Unsure   Do you have spots on the back of your throat? Unsure   Has a rash appeared on your skin since your sore throat started? No   In the past week have you been near anyone that has strep throat? No   Do you have a fever? No   Do you currently have difficulty breathing? No difficulty breathing   Are you wheezing? No   Have you taken a COVID test since symptoms started? No   Do you have ear pain? No   Have you taken an antibiotic in the past month? No   In the past 60 days have you had facial or sinus surgery? No   Do  "you smoke or use smokeless tobacco? No   What is your current weight in pounds? 140   Is there any additional information you would like the provider to know? Our whole family has had colds recently. I never had any nasal symptoms, only the sore throat. It started on 1/17/24 and lasted for 2 days, then got better/more mild. Yesterday it got worse again, but today it feels a little better. I do feel like i have frog in my throat, or like I'm a bit swollen in my glands, but I can't tell for sure. Im not sure if i keep catching things because im around children, or if its the same thing.     Patient has had an ongoing sore throat.  States that she woke up Monday morning with a severe sore throat and felt feverish.  No fevers.  Felt better on Tuesday and has been getting better since.  She reports that it was the worst on Monday.  She also noted inflamed tender lymph nodes bilaterally in her neck.  She has a globus sensation and feels like there is a \"frog in her throat\".  Today she notes some nasal congestion and postnasal drip.  She has a child with bacterial conjunctivitis being treated with bacterial eyedrops.  Patient notes that her eyes are red and have crusty discharge bilaterally.  Her symptoms started around January 17 or 18.  She tested negative for strep yesterday.  Negative for fevers, chest pain, shortness of breath, heartburn, or other symptoms.  She has not had any recent testing for mono.  She is been taking NyQuil at night, loss of throat lozenges, ibuprofen.  She notes a small amount of sinus pressure in her forehead, however not significant sinus pressure.                  Review of Systems  Constitutional, HEENT, cardiovascular, pulmonary, GI, , musculoskeletal, neuro, skin, endocrine and psych systems are negative, except as otherwise noted.      Objective    /60 (BP Location: Left arm, Patient Position: Chair)   Pulse 76   Temp 97.2  F (36.2  C) (Temporal)   Resp 16   Ht 1.626 m (5' 4\") "   Wt 63 kg (138 lb 14.4 oz)   LMP 01/21/2024   SpO2 98%   BMI 23.84 kg/m    Body mass index is 23.84 kg/m .  Physical Exam   GENERAL: alert and no distress  EYES: Eyes grossly normal to inspection, small amount of crusted yellow discharge noted on the eyelashes bilaterally, PERRL and conjunctivae and sclerae normal  NECK: Mild anterior cervical chain lymphadenopathy bilaterally,., no asymmetry, masses, or scars  RESP: lungs clear to auscultation - no rales, rhonchi or wheezes  CV: regular rate and rhythm, normal S1 S2, no S3 or S4, no murmur, click or rub,   MS: no gross musculoskeletal defects noted,   SKIN: no suspicious lesions or rashes  NEURO: Normal strength and tone, mentation intact and speech normal  PSYCH: mentation appears normal, affect normal/bright    Lab on 01/31/2024   Component Date Value Ref Range Status    Group A Strep antigen 01/31/2024 Negative  Negative Final    Group A strep by PCR 01/31/2024 Not Detected  Not Detected Final     Results for orders placed or performed in visit on 02/01/24   Mononucleosis screen     Status: Normal   Result Value Ref Range    Mononucleosis Screen Negative Negative     No results found.        Signed Electronically by: Regi Rivera PA-C

## 2024-02-01 NOTE — PATIENT INSTRUCTIONS
Addendum 2/8/2024:  Patient called with persistent sore throat.  Her other symptoms have resolved.  I sent a [employer] message stating the following:  Lorenzo Stein,    I am glad to hear that you are feeling better.  I am sorry to hear that you are still having a persistent sore throat.  I recommend scheduling a follow-up visit to further assess and discuss a treatment plan.  There are many things that can cause a sore throat and it could be allergies or it could be gastric reflux even if you are not having symptoms of heartburn symptoms.  I would recommend starting over-the-counter omeprazole in the morning, take famotidine at bedtime.  You may also continue with Zyrtec to treat for possible allergies and Flonase nasal spray.  When you use the Flonase, do not sniffle you use it.  You should put the tip of the Flonase bottle in your nose and point out towards your ear, spray it, and it should sit up in the nasal cavity area.  You should not be able to taste the Flonase if you are using it correctly.  I recommend looking up a video for proper nasal spray techniques because it can make a big difference.  I recommend continuing with cold beverages, Tylenol/ibuprofen as needed, honey, cough drops, and over-the-counter throat spray.  I recommend increasing humidity and putting a humidifier in your room.    Regi Rivera PA-C        Offered testing for mono, flu, COVID, and other viral testing.  Patient would like mono testing today.  She declined viral swabs.  She did test negative for strep throat yesterday.  Discussed viral versus bacterial etiology.    Pink eye  Polytrim for 7 days    Post nasal drip/nasal congestion  -Flonase nasal spray OTC  - zyrtec or decongestant as needed    Sore throat  Throat spray OTC  Lozenges    Globus sensation  Consider OTC Pepcid or omeprazole  Monitor for symptoms of heartburn    Follow up with primary care doctor in 1 week if symptoms are not improving.  Sooner if symptoms  worsen.    Worrisome symptoms please go to the emergency department.      GENERAL   [] Drink extra water and other fluids (water being the best)   [] For sore throats in adults and children over 4 years old, use sore throat spray or lozenges   [] Menthol rub (such as Ash's, very helpful in kids)   [] LOTS of hand washing (or hand ) and covering coughs   [] Zinc acetate/gluconate lozenges can shorten course of cold symptoms (no benefit for kids)   [] Stop smoking or being around those who do smoke   [] Rest (consider work/school note)   [] Time - these infections can last up to 2 weeks or more (At times some people can get re-infected with another virus and prolong the cold symptoms. 90% of children are better within 23 days).      SPECIFIC MEDICATIONS (Over the Counter)      [] Fever, aches, ear pain, throat pain (adults):?   [] Tylenol every 6-8 hours as needed while awake AND/OR  ibuprofen every 6-8 hours while awake (take with food and large glass of water). Read bottle for dosing instructions.    **Avoid Ibuprofen/Aleve/Motrin if you have kidney disease or gastric ulcer history. Please ask your doctor if you have questions/concerns about this.**      [] Congestion:?   [] Nasal Saline (Simply Saline), mist vaporizer, steam, humidifier, and Neti Pot   [] Atrovent (ipratropium) spray (can be used 4 times a day and use for 3 weeks), or Afrin (oxymetazoline) 2 sprays each nostril, 1-2 times a day for 3 days (understanding the potential of rebound congestion).?   [] Considering bulb suctioning and NoseFrida for kids.   [] Phenylephrine (avoid in kids under 5, spray examples are Little Remedies and? Minor-Synephrine, oral examples are Sudafed)?   [] Zyrtec D (Cetirizine/antihistamine/pseudoephedrine) OR Allegra D (Fexofenadine/antihistamine/pseudoephedrine) OR Claritin D (Loratadine/antihistamine/pseudoephedrine) -? all for congestion, itch, sneeze, runny nose, watery eyes, itchy throat, and postnasal drip BUT  minimal or no help in kids   [] Tylenol (can help runny nose/congestion) and ibuprofen (can help sneezing) - dose details on bottles      [] Cough   [] Use honey in coffee or tea to relieve cough (do not give honey to an infant under 1 year old)   [] Throat lozenges and cough drops   [] Steam, humidifier, etc...     [] Mucous production:?   [] Salt water gargles   [] Mucinex, Robitussin (for both do not use if under the age of 5, minimal help in kids)   [] Mucinex D (pseudoephedrine/guaifenesin - for mucous and congestion) OR Mucinex DM (dextromethorphan/guaifenesin - for mucous and cough) - both minimal/no help in kids      [] Ear Pain/Pressure   [] Antihistamine (Zyrtec/Cetirizine, Allegra/fexofenadine, Claritin/loratadine)? also for itch, sneeze, runny nose, watery eyes, itchy throat, or postnasal drip).  For best results use in combination with nasal sprays (Flonase or Nasocort):   [] Flonase (fluticasone) 2 sprays in each nostril daily for at least 10-14 days, then 1 spray in each nostril per day afterwards for best results (dont use in kids younger than 5).??      Children (up to age 18):   [] Mainstays treatment are analgesics (tylenol and ibuprofen - do NOT use ibuprofen in children under 6 months, contact your doctor if you have concerns with fever in this age) and nasal saline irrigation (6 times a day for up to 3 weeks).?   [] Honey (over 12 months of age) before bedtime   [] Menthol rub - for congestion, nighttime cough, and sleep (age 2 and older)   [] Warm baths or showered. Humidified air in general.   [] Warm/cool liquids for comfort (even liquids you typically wouldn't give your child I.e. juice or Gatorade)   [] Vitamin C - (may reduce cold symptom duration by 18% if started prior cold symptoms onset)    [] Some children get multiple illnesses in a row and can prolong the recovery course. Sometimes up to 12 illness episodes a year             Patient understood and verbally consented to the  treatment plan. Discussed symptoms that would warrant an urgent or emergent visit. All of the patients' questions were answered. Patient was instructed to contact the clinic if questions or concerns arise. Recommend follow up appointments if symptoms worsen or fail to improve. Recommend follow up as needed. Recommend ER in the case of an emergency.    Regi Rivera PA-C    Please note: Voice recognition software may have been used in preparing this note, unintended word substitutions may be present.

## 2024-02-09 ENCOUNTER — MYC MEDICAL ADVICE (OUTPATIENT)
Dept: FAMILY MEDICINE | Facility: CLINIC | Age: 34
End: 2024-02-09

## 2024-02-09 DIAGNOSIS — J02.9 SORE THROAT: Primary | ICD-10-CM

## 2024-02-09 NOTE — TELEPHONE ENCOUNTER
I recommend scheduling a follow-up with any provider due to persistent, new, or worsening symptoms.  I think we should reevaluate you and take a look at your ears and throat for assessment.  Due to changes in symptoms, I recommend office visit for further evaluation.  I am more than happy to see you in the same day clinic if you like.  Please assist patient with scheduling, you can use one of my same-day slots.  If symptoms worsen or if you develop any emergent symptoms like wheezing, shortness of breath, throat swelling, I recommend going to the ER or urgent care over the weekend.

## 2024-02-12 ENCOUNTER — TELEPHONE (OUTPATIENT)
Dept: FAMILY MEDICINE | Facility: CLINIC | Age: 34
End: 2024-02-12

## 2024-02-12 NOTE — TELEPHONE ENCOUNTER
Telephone encounter created for this Plated message on 2/12/24 with provider advice as well. [employer] encounter and refer to Telephone encounter on 2/12/24.    Adela Vasquez RN on 2/12/2024 at 9:53 AM

## 2024-02-12 NOTE — TELEPHONE ENCOUNTER
"RN Triage    Patient Contact    Attempt # 1    Was call answered?  No.  Left message on voicemail with information to call me back.    Please Relay Message from [employer] encounter to Patient per TAMI Kern:    Regi Rivera PA-C Physician Assistant - C Signed2/9/2024        I recommend scheduling a follow-up with any provider due to persistent, new, or worsening symptoms.  I think we should reevaluate you and take a look at your ears and throat for assessment.  Due to changes in symptoms, I recommend office visit for further evaluation.  I am more than happy to see you in the same day clinic if you like.  Please assist patient with scheduling, you can use one of my same-day slots.  If symptoms worsen or if you develop any emergent symptoms like wheezing, shortness of breath, throat swelling, I recommend going to the ER or urgent care over the weekend.           Regi Rivera PA-C Physician Assistant - C Signed2/9/2024        ENT referral placed for follow-up.  Patient been having persistent sore throat.                    Sarita WEBB [facility] Messages (supporting Regi Rivera PA-C)3 days ago     Lorenzo Deluna,     I received your last message and gave started implementing what you suggested. Do I schedule the follow up with you? Or someone else?     Also, I have new sensations in case this gives us a clue. My soreness is now less in my throat, and more under my tongue/left and right jaw. It also feels like it's in my left ear.      It was so painful last night that it woke me up at 3am. I took ibuprofen and after awhile it took the edge off, but didn't go away.     It almost feels like something is \"stuck\" in there... Like I could reach in my ear and pull whatever it is out and be relieved.     It's a really strange sensation that I've never had before.     Let me know who to schedule with, and if the treatment plan should change based on this new information.  "

## 2024-02-13 NOTE — TELEPHONE ENCOUNTER
RN TRIAGE CALL:    Patient Contact    Attempt # 2    Was call answered?  No.  Left message on voicemail with information to call any one of the triage nurses back regarding her recent My Chart message.    Sinai Nagy, RN

## 2024-02-14 NOTE — TELEPHONE ENCOUNTER
RN Triage    Patient Contact    Attempt # 3    Was call answered?  No.  Left message on voicemail with information to call me back.    Laurita Orr RN on 2/14/2024 at 8:44 AM

## 2024-02-15 NOTE — TELEPHONE ENCOUNTER
RN Triage    Patient Contact    Attempt # 4    Was call answered?  No.  Left message on voicemail with information to call me back. The following MyChart was sent as well:     Lorenzo Stein,    We have tried to reach out to you by phone a few times and wanted to give you Regi Rivera's recommendation regarding your MyChart message from 2/9/24.    Per TAMI Kern:    I recommend scheduling a follow-up with any provider due to persistent, new, or worsening symptoms.  I think we should reevaluate you and take a look at your ears and throat for assessment.  Due to changes in symptoms, I recommend office visit for further evaluation.  I am more than happy to see you in the same day clinic if you like. If symptoms worsen or if you develop any emergent symptoms like wheezing, shortness of breath, throat swelling, I recommend going to the ER or urgent care over the weekend.     She also placed a referral for ENT for you.    If you would like to schedule a follow-up appointment or want to speak to a nurse regarding this please call [phone], and press option 2 to speak to a triage nurse.    Thank you,  RN Pablo Triage Team      Closing this encounter as we have attempted to reach patient 4 times now and also sent a MyChart message.    Adela Vasquez RN on 2/15/2024 at 9:44 AM

## 2024-07-07 ENCOUNTER — HEALTH MAINTENANCE LETTER (OUTPATIENT)
Age: 34
End: 2024-07-07

## 2025-03-02 ENCOUNTER — NURSE TRIAGE (OUTPATIENT)
Dept: NURSING | Facility: CLINIC | Age: 35
End: 2025-03-02

## 2025-03-03 NOTE — TELEPHONE ENCOUNTER
Nurse Triage SBAR    Is this a 2nd Level Triage? NO    Situation: Cold with cough and fever    Background:   -Most concerrned about fever, ear and congestion/coughing  -Fever on Frid. Started on Fri 104, ended on Sat.  -    Assessment:   -Temperature on Friday up to 104, then absent on Saturday, today returned  -Had 2 days, Temperature: @1830, 100 (ax)  -Now Temperature 101.2 (axillary)  -Lots of congestin and pain in ear  -Dayquil,   -Nyquil  -Cold symptoms with cough  -Nasal congestion  --Dayquil: Liquid 325 mg/ 30 mL,   -Nyquil: Liquid 650 mg/ 30mL @1830,       Protocol Recommended Disposition:   See PCP Within 24 Hours    Recommendation:   -Care advice reviewed. Patient verbalized understanding and agreed to follow care advice given. Advised to call back with any new signs or symptoms, Patient agreed  -Transferred to scheduling to make appt. For tomorrow in clinic,       Reason for Disposition   [1] Fever returns after gone for over 24 hours AND [2] symptoms worse or not improved    Additional Information   Negative: SEVERE difficulty breathing (e.g., struggling for each breath, speaks in single words)   Negative: Sounds like a life-threatening emergency to the triager   Negative: [1] Difficulty breathing AND [2] not from stuffy nose (e.g., not relieved by cleaning out the nose)   Negative: Runny nose is caused by pollen or other allergies   Negative: Cough is main symptom   Negative: Severe sore throat   Negative: Fever > 104 F (40 C)   Negative: Patient sounds very sick or weak to the triager   Negative: [1] Fever > 101 F (38.3 C) AND [2] age > 60 years   Negative: [1] Fever > 100.0 F (37.8 C) AND [2] bedridden (e.g., CVA, chronic illness, recovering from surgery)   Negative: [1] Fever > 100.0 F (37.8 C) AND [2] diabetes mellitus or weak immune system (e.g., HIV positive, cancer chemo, splenectomy, organ transplant, chronic steroids)   Negative: Fever present > 3 days (72 hours)    Protocols used: Common  Cold-A-AH  Annie Bradley RN, Triage Nurse Advisor, 3/2/2025 8:35 PM

## 2025-03-15 ENCOUNTER — E-VISIT (OUTPATIENT)
Dept: URGENT CARE | Facility: CLINIC | Age: 35
End: 2025-03-15

## 2025-03-15 DIAGNOSIS — J01.90 ACUTE SINUSITIS WITH SYMPTOMS > 10 DAYS: Primary | ICD-10-CM

## 2025-03-15 NOTE — PATIENT INSTRUCTIONS
Acute Sinusitis: Care Instructions  Overview     Acute sinusitis is an inflammation of the mucous membranes inside the nose and sinuses. Sinuses are the hollow spaces in your skull around the eyes and nose. Acute sinusitis often follows a cold. Acute sinusitis causes thick, discolored mucus that drains from the nose or down the back of the throat. It also can cause pain and pressure in your head and face along with a stuffy or blocked nose.  In most cases, sinusitis gets better on its own in 1 to 2 weeks. But some mild symptoms may last for several weeks. Sometimes antibiotics are needed if there is a bacterial infection.  Follow-up care is a key part of your treatment and safety. Be sure to make and go to all appointments, and call your doctor if you are having problems. It's also a good idea to know your test results and keep a list of the medicines you take.  How can you care for yourself at home?  Use saline (saltwater) nasal washes. This can help keep your nasal passages open and wash out mucus and allergens.  You can buy saline nose washes at a grocery store or drugstore. Follow the instructions on the package.  You can make your own at home. Add 1 teaspoon of non-iodized salt and 1 teaspoon of baking soda to 2 cups of distilled or boiled and cooled water. Fill a squeeze bottle or a nasal cleansing pot (such as a neti pot) with the nasal wash. Then put the tip into your nostril, and lean over the sink. With your mouth open, gently squirt the liquid. Repeat on the other side.  Try a decongestant nasal spray like oxymetazoline (Afrin). Do not use it for more than 3 days in a row. Using it for more than 3 days can make your congestion worse.  If needed, take an over-the-counter pain medicine, such as acetaminophen (Tylenol), ibuprofen (Advil, Motrin), or naproxen (Aleve). Read and follow all instructions on the label.  If the doctor prescribed antibiotics, take them as directed. Do not stop taking them just  "because you feel better. You need to take the full course of antibiotics.  Be careful when taking over-the-counter cold or flu medicines and Tylenol at the same time. Many of these medicines have acetaminophen, which is Tylenol. Read the labels to make sure that you are not taking more than the recommended dose. Too much acetaminophen (Tylenol) can be harmful.  Try a steroid nasal spray. It may help with your symptoms.  Breathe warm, moist air. You can use a steamy shower, a hot bath, or a sink filled with hot water. Avoid cold, dry air. Using a humidifier in your home may help. Follow the directions for cleaning the machine.  When should you call for help?   Call your doctor now or seek immediate medical care if:    You have new or worse swelling, redness, or pain in your face or around one or both of your eyes.     You have double vision or a change in your vision.     You have a high fever.     You have a severe headache and a stiff neck.     You have mental changes, such as feeling confused or much less alert.   Watch closely for changes in your health, and be sure to contact your doctor if:    You are not getting better as expected.   Where can you learn more?  Go to https://www.[employer].Ultromex/patiented  Enter I933 in the search box to learn more about \"Acute Sinusitis: Care Instructions.\"  Current as of: October 27, 2024  Content Version: 14.4    [id number] [employer].   Care instructions adapted under license by your healthcare professional. If you have questions about a medical condition or this instruction, always ask your healthcare professional. [employer] disclaims any warranty or liability for your use of this information.    You may want to try a nasal lavage (also known as nasal irrigation). You can find over-the-counter products, such as Neti-Pot, at retail locations or make your own at home. Instructions for homemade nasal lavage and more information on the process are " available online at http://www.aafp.org/afp/2009/1115/p1121.html.    Dear Eden Yanez    After reviewing your responses, I've been able to diagnose you with Acute sinusitis with symptoms > 10 days.      Based on your responses and diagnosis, I have prescribed   Orders Placed This Encounter   Medications     amoxicillin-clavulanate (AUGMENTIN) 875-125 MG tablet     Sig: Take 1 tablet by mouth 2 times daily for 7 days.     Dispense:  14 tablet     Refill:  0      to treat your symptoms. I have sent this to your pharmacy.?     It is also important to stay well hydrated, get lots of rest and take over-the-counter decongestants,?tylenol?or ibuprofen if you?are able to?take those medications per your primary care provider to help relieve discomfort.?     It is important that you take?all of?your prescribed medication even if your symptoms are improving after a few doses.? Taking?all of?your medicine helps prevent the symptoms from returning.?     If your symptoms worsen, you develop severe headache, vomiting, high fever (>102), or are not improving in 7 days, please contact your primary care provider for an appointment or visit any of our convenient Walk-in Care or Urgent Care Centers to be seen which can be found on our website?here.?     Thanks again for choosing?us?as your health care partner,?   ?  Radha Becerril MD?

## 2025-04-28 ENCOUNTER — MYC MEDICAL ADVICE (OUTPATIENT)
Dept: FAMILY MEDICINE | Facility: CLINIC | Age: 35
End: 2025-04-28

## 2025-04-28 NOTE — TELEPHONE ENCOUNTER
Patient states she is still having a panic attack but it is better than this morning.    She was on alprazolam before in 2021.    She states she has not had symptoms for years.  She states she has had an increase in stress lately.    She is wanting to know if she can restart her alprazolam.    She will do an evisit.    'Laurita Orr RN on 4/28/2025 at 11:38 AM

## 2025-04-28 NOTE — TELEPHONE ENCOUNTER
Likely still effective to use old prescription if needed however follow-up visit to discuss would be best. I have not seen her since 2021. OK to add to any open slot.     Christine Davison PA-C

## 2025-04-28 NOTE — TELEPHONE ENCOUNTER
Patient was scheduled for appt 5/19/25.     Relayed provider's message as well. She verbalized understanding and will call back with any worsening symptoms.    Patricia Hunter RN on 4/28/2025 at 3:05 PM

## 2025-07-19 ENCOUNTER — HEALTH MAINTENANCE LETTER (OUTPATIENT)
Age: 35
End: 2025-07-19

## 2025-08-05 ENCOUNTER — E-VISIT (OUTPATIENT)
Dept: URGENT CARE | Facility: CLINIC | Age: 35
End: 2025-08-05

## 2025-08-05 DIAGNOSIS — N39.0 ACUTE UTI (URINARY TRACT INFECTION): Primary | ICD-10-CM

## 2025-08-05 RX ORDER — NITROFURANTOIN 25; 75 MG/1; MG/1
100 CAPSULE ORAL 2 TIMES DAILY
Qty: 10 CAPSULE | Refills: 0 | Status: SHIPPED | OUTPATIENT
Start: 2025-08-05 | End: 2025-08-10

## (undated) DEVICE — STOCKING SLEEVE COMPRESSION CALF MED

## (undated) DEVICE — CATH TRAY FOLEY 16FR DRAINAGE BAG STATLOCK 899916

## (undated) DEVICE — GLOVE PROTEXIS W/NEU-THERA 7.5  2D73TE75

## (undated) DEVICE — SU PLAIN 0 FN-2 27" N864H

## (undated) DEVICE — DRSG TEGADERM 6X8" 1628

## (undated) DEVICE — SU VICRYL 3-0 CP-2 27" UND J868H

## (undated) DEVICE — SOL NACL 0.9% IRRIG 1000ML BOTTLE 07138-09

## (undated) DEVICE — SU MONOCRYL 3-0 KS 27" UND Y523H

## (undated) DEVICE — PREP CHLORAPREP 1.5ML CLR

## (undated) DEVICE — PREP CHLORAPREP 26ML TINTED ORANGE  260815

## (undated) DEVICE — CLAMP CORD

## (undated) DEVICE — PACK C-SECTION

## (undated) DEVICE — ESU GROUND PAD UNIVERSAL W/O CORD

## (undated) DEVICE — SU VICRYL 0 CT-1 36" J346H

## (undated) DEVICE — DRSG ABDOMINAL 07 1/2X8" 7197D

## (undated) RX ORDER — OXYTOCIN/0.9 % SODIUM CHLORIDE 30/500 ML
PLASTIC BAG, INJECTION (ML) INTRAVENOUS
Status: DISPENSED
Start: 2018-08-20

## (undated) RX ORDER — METHYLERGONOVINE MALEATE 0.2 MG/ML
INJECTION INTRAVENOUS
Status: DISPENSED
Start: 2018-08-20

## (undated) RX ORDER — FENTANYL CITRATE 50 UG/ML
INJECTION, SOLUTION INTRAMUSCULAR; INTRAVENOUS
Status: DISPENSED
Start: 2018-08-20

## (undated) RX ORDER — PHENYLEPHRINE HCL IN 0.9% NACL 1 MG/10 ML
SYRINGE (ML) INTRAVENOUS
Status: DISPENSED
Start: 2018-08-20

## (undated) RX ORDER — ONDANSETRON 2 MG/ML
INJECTION INTRAMUSCULAR; INTRAVENOUS
Status: DISPENSED
Start: 2018-08-20